# Patient Record
Sex: MALE | Race: WHITE | Employment: FULL TIME | URBAN - METROPOLITAN AREA
[De-identification: names, ages, dates, MRNs, and addresses within clinical notes are randomized per-mention and may not be internally consistent; named-entity substitution may affect disease eponyms.]

---

## 2017-03-24 ENCOUNTER — TELEPHONE (OUTPATIENT)
Dept: FAMILY MEDICINE CLINIC | Facility: CLINIC | Age: 47
End: 2017-03-24

## 2017-03-24 RX ORDER — AZITHROMYCIN 250 MG/1
250 TABLET, FILM COATED ORAL DAILY
Qty: 1 PACKAGE | Refills: 0 | Status: SHIPPED | OUTPATIENT
Start: 2017-03-24 | End: 2017-06-06 | Stop reason: ALTCHOICE

## 2017-03-24 NOTE — TELEPHONE ENCOUNTER
Patient c/o sore throat and runny nose for the past 4 days. Coughing and spitting up green mucus. Chest and head congestion. No shortness of breath. No temp. Leaving for a [place] cruise tomorrow. Dr Michelle Florain usually prescribes a [place] Tae for sinus infection.

## 2017-06-05 ENCOUNTER — NURSE ONLY (OUTPATIENT)
Dept: FAMILY MEDICINE CLINIC | Facility: CLINIC | Age: 47
End: 2017-06-05

## 2017-06-05 DIAGNOSIS — Z02.9 ENCOUNTERS FOR ADMINISTRATIVE PURPOSE: Primary | ICD-10-CM

## 2017-06-05 NOTE — PROGRESS NOTES
Patient walked into clinic for BP check. Patient states he had a nose bleed and his wife told him it could be high blood pressure. Patient also state he has had bad allergies this spring.   Patient states he checked his BP several times at home and it kep

## 2017-06-06 ENCOUNTER — TELEPHONE (OUTPATIENT)
Dept: FAMILY MEDICINE CLINIC | Facility: CLINIC | Age: 47
End: 2017-06-06

## 2017-06-06 NOTE — TELEPHONE ENCOUNTER
Left message for patient to call office back. Office phone number provided. MD Rajni Reina Nurse                   Please have him see me this week. Have him stop any Sudafed/decongestant type products.  Have him stop any Motrin/

## 2017-06-06 NOTE — TELEPHONE ENCOUNTER
Pt notified by phone. Appt scheduled for 6/8/17.     Future Appointments  Date Time Provider Brennon Franco   6/8/2017 4:00 PM Lyndsey Chan MD [facility]   7/14/2017 8:15 AM Lyndsey Chan MD [facility]

## 2017-06-08 ENCOUNTER — OFFICE VISIT (OUTPATIENT)
Dept: FAMILY MEDICINE CLINIC | Facility: CLINIC | Age: 47
End: 2017-06-08

## 2017-06-08 VITALS
SYSTOLIC BLOOD PRESSURE: 140 MMHG | BODY MASS INDEX: 36 KG/M2 | HEART RATE: 76 BPM | TEMPERATURE: 98 F | DIASTOLIC BLOOD PRESSURE: 100 MMHG | WEIGHT: 257 LBS | RESPIRATION RATE: 16 BRPM

## 2017-06-08 DIAGNOSIS — R53.83 FATIGUE, UNSPECIFIED TYPE: ICD-10-CM

## 2017-06-08 DIAGNOSIS — R04.0 EPISTAXIS: ICD-10-CM

## 2017-06-08 DIAGNOSIS — G89.29 CHRONIC CHEST WALL PAIN: ICD-10-CM

## 2017-06-08 DIAGNOSIS — R07.89 CHRONIC CHEST WALL PAIN: ICD-10-CM

## 2017-06-08 DIAGNOSIS — R03.0 ELEVATED BLOOD PRESSURE READING: Primary | ICD-10-CM

## 2017-06-08 PROCEDURE — 84443 ASSAY THYROID STIM HORMONE: CPT | Performed by: FAMILY MEDICINE

## 2017-06-08 PROCEDURE — 84439 ASSAY OF FREE THYROXINE: CPT | Performed by: FAMILY MEDICINE

## 2017-06-08 PROCEDURE — 99214 OFFICE O/P EST MOD 30 MIN: CPT | Performed by: FAMILY MEDICINE

## 2017-06-08 PROCEDURE — 36415 COLL VENOUS BLD VENIPUNCTURE: CPT | Performed by: FAMILY MEDICINE

## 2017-06-08 RX ORDER — CYCLOBENZAPRINE HCL 10 MG
10 TABLET ORAL NIGHTLY PRN
Qty: 30 TABLET | Refills: 0 | Status: SHIPPED | OUTPATIENT
Start: 2017-06-08 | End: 2017-07-07

## 2017-06-08 RX ORDER — LISINOPRIL 10 MG/1
10 TABLET ORAL DAILY
Qty: 30 TABLET | Refills: 1 | Status: SHIPPED | OUTPATIENT
Start: 2017-06-08 | End: 2017-07-17

## 2017-06-08 NOTE — PROGRESS NOTES
Woo Rodrigues is a 41year old male. CC:  Patient presents with:  Blood Pressure: has had bloody noses per pt      HPI:  Worried about elevated BP. He noted to episodes of epistaxis this past week.  He has never had this in the past. He also notes nhan [OTHER] Mother         Relation Status Death Age Comments   Fa  62 prostate cancer   Mo Alive          Smoking Status: Never Smoker                      Smokeless Status: Never Used                        Alcohol Use: Yes           1.8 oz/week Follow-up on file.                 Authorized by Chris Mccormick M.D.

## 2017-06-22 ENCOUNTER — OFFICE VISIT (OUTPATIENT)
Dept: FAMILY MEDICINE CLINIC | Facility: CLINIC | Age: 47
End: 2017-06-22

## 2017-06-22 VITALS
WEIGHT: 260.38 LBS | BODY MASS INDEX: 36 KG/M2 | RESPIRATION RATE: 16 BRPM | HEART RATE: 84 BPM | SYSTOLIC BLOOD PRESSURE: 122 MMHG | DIASTOLIC BLOOD PRESSURE: 85 MMHG | TEMPERATURE: 99 F

## 2017-06-22 DIAGNOSIS — I10 ESSENTIAL HYPERTENSION: Primary | ICD-10-CM

## 2017-06-22 DIAGNOSIS — Z23 NEED FOR VACCINATION: ICD-10-CM

## 2017-06-22 PROCEDURE — 99213 OFFICE O/P EST LOW 20 MIN: CPT | Performed by: FAMILY MEDICINE

## 2017-06-22 RX ORDER — ASPIRIN 81 MG/1
81 TABLET, CHEWABLE ORAL DAILY
Refills: 0 | COMMUNITY
End: 2020-07-21

## 2017-06-22 NOTE — PROGRESS NOTES
Daniel Coats is a 41year old male. CC:  Patient presents with:   Follow - Up: BP  per pt      HPI:  Here to follow up hypertension  Home BP readings: none  Medication side effects: none  Chest pain: none  Headaches: are now gone  Visual changes: non oz/week       0 Standard drinks or equivalent, 3 Cans of beer per week       Comment: occasional       ROS:  General: energy level stable  Musculoskeletal: back pain is much jose with the Flexeril    Vitals: /85 mmHg  Pulse 84  Temp(Src) 99.1 °F (37

## 2017-06-26 RX ORDER — PRAVASTATIN SODIUM 40 MG
TABLET ORAL
Qty: 30 TABLET | Refills: 5 | Status: SHIPPED | OUTPATIENT
Start: 2017-06-26 | End: 2017-07-17 | Stop reason: ALTCHOICE

## 2017-06-26 NOTE — TELEPHONE ENCOUNTER
Last OV 6/22/17, Future Appointments  Date Time Provider Brennon Franco   7/14/2017 8:15 AM Lazarus Capron, MD [facility] Joseamanda Staffordmartin       Last rx given 12/20/16

## 2017-07-07 RX ORDER — CYCLOBENZAPRINE HCL 10 MG
TABLET ORAL
Qty: 30 TABLET | Refills: 0 | Status: SHIPPED | OUTPATIENT
Start: 2017-07-07 | End: 2019-02-25

## 2017-07-14 ENCOUNTER — OFFICE VISIT (OUTPATIENT)
Dept: FAMILY MEDICINE CLINIC | Facility: CLINIC | Age: 47
End: 2017-07-14

## 2017-07-14 VITALS
BODY MASS INDEX: 37.5 KG/M2 | RESPIRATION RATE: 16 BRPM | HEIGHT: 69.5 IN | WEIGHT: 259 LBS | SYSTOLIC BLOOD PRESSURE: 122 MMHG | HEART RATE: 72 BPM | TEMPERATURE: 98 F | DIASTOLIC BLOOD PRESSURE: 88 MMHG

## 2017-07-14 DIAGNOSIS — Z80.42 FAMILY HISTORY OF PROSTATE CANCER IN FATHER: ICD-10-CM

## 2017-07-14 DIAGNOSIS — I10 ESSENTIAL HYPERTENSION: ICD-10-CM

## 2017-07-14 DIAGNOSIS — E78.5 HYPERLIPIDEMIA, UNSPECIFIED HYPERLIPIDEMIA TYPE: ICD-10-CM

## 2017-07-14 DIAGNOSIS — Z00.00 WELL ADULT EXAM: Primary | ICD-10-CM

## 2017-07-14 DIAGNOSIS — Z23 NEED FOR VACCINATION: ICD-10-CM

## 2017-07-14 LAB
ALBUMIN SERPL-MCNC: 4.2 G/DL (ref 3.5–4.8)
ALP LIVER SERPL-CCNC: 58 U/L (ref 45–117)
ALT SERPL-CCNC: 60 U/L (ref 17–63)
AST SERPL-CCNC: 26 U/L (ref 15–41)
BILIRUB SERPL-MCNC: 0.8 MG/DL (ref 0.1–2)
BUN BLD-MCNC: 14 MG/DL (ref 8–20)
CALCIUM BLD-MCNC: 8.9 MG/DL (ref 8.3–10.3)
CHLORIDE: 106 MMOL/L (ref 101–111)
CHOLEST SMN-MCNC: 242 MG/DL (ref ?–200)
CO2: 26 MMOL/L (ref 22–32)
COMPLEXED PSA SERPL-MCNC: 0.39 NG/ML (ref 0.01–4)
CREAT BLD-MCNC: 0.92 MG/DL (ref 0.7–1.3)
ERYTHROCYTE [DISTWIDTH] IN BLOOD BY AUTOMATED COUNT: 12.5 % (ref 11.5–16)
GLUCOSE BLD-MCNC: 89 MG/DL (ref 70–99)
HCT VFR BLD AUTO: 47.4 % (ref 37–53)
HDLC SERPL-MCNC: 44 MG/DL (ref 45–?)
HDLC SERPL: 5.5 {RATIO} (ref ?–4.97)
HGB BLD-MCNC: 16 G/DL (ref 13–17)
LDLC SERPL CALC-MCNC: 146 MG/DL (ref ?–130)
M PROTEIN MFR SERPL ELPH: 8.2 G/DL (ref 6.1–8.3)
MCH RBC QN AUTO: 30.6 PG (ref 27–33.2)
MCHC RBC AUTO-ENTMCNC: 33.8 G/DL (ref 31–37)
MCV RBC AUTO: 90.6 FL (ref 80–99)
NONHDLC SERPL-MCNC: 198 MG/DL (ref ?–130)
PLATELET # BLD AUTO: 109 10(3)UL (ref 150–450)
POTASSIUM SERPL-SCNC: 4.1 MMOL/L (ref 3.6–5.1)
RBC # BLD AUTO: 5.23 X10(6)UL (ref 4.3–5.7)
RED CELL DISTRIBUTION WIDTH-SD: 41.1 FL (ref 35.1–46.3)
SODIUM SERPL-SCNC: 139 MMOL/L (ref 136–144)
TRIGLYCERIDES: 261 MG/DL (ref ?–150)
TSI SER-ACNC: 2.55 MIU/ML (ref 0.35–5.5)
VLDL: 52 MG/DL (ref 5–40)
WBC # BLD AUTO: 4.2 X10(3) UL (ref 4–13)

## 2017-07-14 PROCEDURE — 90471 IMMUNIZATION ADMIN: CPT | Performed by: FAMILY MEDICINE

## 2017-07-14 PROCEDURE — 90715 TDAP VACCINE 7 YRS/> IM: CPT | Performed by: FAMILY MEDICINE

## 2017-07-14 PROCEDURE — 84153 ASSAY OF PSA TOTAL: CPT | Performed by: FAMILY MEDICINE

## 2017-07-14 PROCEDURE — 80050 GENERAL HEALTH PANEL: CPT | Performed by: FAMILY MEDICINE

## 2017-07-14 PROCEDURE — 80061 LIPID PANEL: CPT | Performed by: FAMILY MEDICINE

## 2017-07-14 PROCEDURE — 36415 COLL VENOUS BLD VENIPUNCTURE: CPT | Performed by: FAMILY MEDICINE

## 2017-07-14 PROCEDURE — 99396 PREV VISIT EST AGE 40-64: CPT | Performed by: FAMILY MEDICINE

## 2017-07-14 NOTE — PROGRESS NOTES
Gavin Mail is a 41year old male. CC:  Patient presents with:   Well Adult: per pt      HPI:  Yearly PX   Last lipid: 11/16    Immunization History   Administered            Date(s) Administered     TDAP                  12/13/2006     [facility]  No date: OTHER SURGICAL HISTORY      Comment: L shoulder for labral tear  No date: THYROIDECTOMY Right      Comment: Dr. Flor Steel, 2016   Family History   Problem Relation Age of Onset   • Hypertension Father    • hyperlipidemia [Other] [OT exam  Patient has been recommended 1500 calorie diet and 150 minutes of aerobic exercise per week. - COMP METABOLIC PANEL (14); Future  - CBC, PLATELET; NO DIFFERENTIAL; Future  - LIPID PANEL;  Future  - PSA SCREEN; Future  - TSH W REFLEX TO FREE T4; Futu

## 2017-07-17 ENCOUNTER — TELEPHONE (OUTPATIENT)
Dept: FAMILY MEDICINE CLINIC | Facility: CLINIC | Age: 47
End: 2017-07-17

## 2017-07-17 RX ORDER — LOSARTAN POTASSIUM 25 MG/1
25 TABLET ORAL DAILY
Qty: 30 TABLET | Refills: 1 | Status: SHIPPED | OUTPATIENT
Start: 2017-07-17 | End: 2017-09-15

## 2017-07-17 RX ORDER — ATORVASTATIN CALCIUM 40 MG/1
40 TABLET, FILM COATED ORAL NIGHTLY
Qty: 30 TABLET | Refills: 1 | Status: SHIPPED | OUTPATIENT
Start: 2017-07-17 | End: 2017-09-15

## 2017-07-17 NOTE — TELEPHONE ENCOUNTER
Patient advised. Verbalizes understanding. States he has been taking his Pravachol consistently. Please advise and sign Losartan order.

## 2017-07-17 NOTE — TELEPHONE ENCOUNTER
The Pravachol is not working well. I want him to stop it. I have sent over Lipitor to be taken in its place. It is a stronger lipid lowering medication. I will see him back in one month. Have him come fasting again to repeat lipid on the Lipitor.  Thanks

## 2017-07-17 NOTE — TELEPHONE ENCOUNTER
----- Message from Senait Fields MD sent at 7/17/2017  7:54 AM CDT -----  Please let patient know that the prostate, sugar, electrolyte, liver, kidney, thyroid, anemia and leukemia tests were fine.  His lipids have jumped significantly from about 6 months

## 2017-08-21 ENCOUNTER — OFFICE VISIT (OUTPATIENT)
Dept: FAMILY MEDICINE CLINIC | Facility: CLINIC | Age: 47
End: 2017-08-21

## 2017-08-21 VITALS
TEMPERATURE: 97 F | SYSTOLIC BLOOD PRESSURE: 125 MMHG | RESPIRATION RATE: 20 BRPM | WEIGHT: 261 LBS | BODY MASS INDEX: 38 KG/M2 | HEART RATE: 76 BPM | DIASTOLIC BLOOD PRESSURE: 85 MMHG

## 2017-08-21 DIAGNOSIS — I10 ESSENTIAL HYPERTENSION: Primary | ICD-10-CM

## 2017-08-21 DIAGNOSIS — E78.5 HYPERLIPIDEMIA, UNSPECIFIED HYPERLIPIDEMIA TYPE: ICD-10-CM

## 2017-08-21 DIAGNOSIS — D69.6 THROMBOCYTOPENIA (HCC): ICD-10-CM

## 2017-08-21 LAB
ALBUMIN SERPL-MCNC: 4.2 G/DL (ref 3.5–4.8)
ALP LIVER SERPL-CCNC: 72 U/L (ref 45–117)
ALT SERPL-CCNC: 51 U/L (ref 17–63)
AST SERPL-CCNC: 18 U/L (ref 15–41)
BILIRUB SERPL-MCNC: 0.7 MG/DL (ref 0.1–2)
BUN BLD-MCNC: 16 MG/DL (ref 8–20)
CALCIUM BLD-MCNC: 9.2 MG/DL (ref 8.3–10.3)
CHLORIDE: 109 MMOL/L (ref 101–111)
CHOLEST SMN-MCNC: 166 MG/DL (ref ?–200)
CO2: 25 MMOL/L (ref 22–32)
CREAT BLD-MCNC: 0.85 MG/DL (ref 0.7–1.3)
ERYTHROCYTE [DISTWIDTH] IN BLOOD BY AUTOMATED COUNT: 12.5 % (ref 11.5–16)
GLUCOSE BLD-MCNC: 90 MG/DL (ref 70–99)
HCT VFR BLD AUTO: 43.6 % (ref 37–53)
HDLC SERPL-MCNC: 46 MG/DL (ref 45–?)
HDLC SERPL: 3.61 {RATIO} (ref ?–4.97)
HGB BLD-MCNC: 15.2 G/DL (ref 13–17)
LDLC SERPL CALC-MCNC: 88 MG/DL (ref ?–130)
LDLC SERPL-MCNC: 32 MG/DL (ref 5–40)
M PROTEIN MFR SERPL ELPH: 8.3 G/DL (ref 6.1–8.3)
MCH RBC QN AUTO: 31.1 PG (ref 27–33.2)
MCHC RBC AUTO-ENTMCNC: 34.9 G/DL (ref 31–37)
MCV RBC AUTO: 89.3 FL (ref 80–99)
NONHDLC SERPL-MCNC: 120 MG/DL (ref ?–130)
PLATELET # BLD AUTO: 220 10(3)UL (ref 150–450)
POTASSIUM SERPL-SCNC: 4.1 MMOL/L (ref 3.6–5.1)
RBC # BLD AUTO: 4.88 X10(6)UL (ref 4.3–5.7)
RED CELL DISTRIBUTION WIDTH-SD: 41 FL (ref 35.1–46.3)
SODIUM SERPL-SCNC: 142 MMOL/L (ref 136–144)
TRIGLYCERIDES: 159 MG/DL (ref ?–150)
WBC # BLD AUTO: 5.9 X10(3) UL (ref 4–13)

## 2017-08-21 PROCEDURE — 99213 OFFICE O/P EST LOW 20 MIN: CPT | Performed by: FAMILY MEDICINE

## 2017-08-21 PROCEDURE — 85027 COMPLETE CBC AUTOMATED: CPT | Performed by: FAMILY MEDICINE

## 2017-08-21 PROCEDURE — 36415 COLL VENOUS BLD VENIPUNCTURE: CPT | Performed by: FAMILY MEDICINE

## 2017-08-21 PROCEDURE — 80053 COMPREHEN METABOLIC PANEL: CPT | Performed by: FAMILY MEDICINE

## 2017-08-21 PROCEDURE — 80061 LIPID PANEL: CPT | Performed by: FAMILY MEDICINE

## 2017-08-21 NOTE — PROGRESS NOTES
Rickey Dakin is a 41year old male. CC:  Patient presents with:   Follow - Up: on meds and bloodwork per pt      HPI:  Here to follow up hypertension  Home BP readings: none  Medication side effects: none  Chest pain: none  Headaches: none  Visu hyperlipidemia [Other] [OTHER] Mother         Relation Status Comments   Fa  at age 62 prostate cancer   Mo Alive       Smoking status: Never Smoker                                                              Smokeless tobacco: Never Used requested or ordered in this encounter      No Follow-up on file.                 Authorized by Ritchie Chew M.D.

## 2017-09-15 NOTE — TELEPHONE ENCOUNTER
Last refill on Losartan # 30 with 1 refill on 7 17 2017  Last refill on Atorvastatin #30 with 1 refill on 7 17 2017   Last OV on 8 21 2017

## 2017-09-16 RX ORDER — LOSARTAN POTASSIUM 25 MG/1
TABLET ORAL
Qty: 30 TABLET | Refills: 5 | Status: SHIPPED | OUTPATIENT
Start: 2017-09-16 | End: 2018-02-05

## 2017-09-16 RX ORDER — ATORVASTATIN CALCIUM 40 MG/1
TABLET, FILM COATED ORAL
Qty: 30 TABLET | Refills: 5 | Status: SHIPPED | OUTPATIENT
Start: 2017-09-16 | End: 2018-02-05

## 2018-02-05 RX ORDER — LOSARTAN POTASSIUM 25 MG/1
TABLET ORAL
Qty: 30 TABLET | Refills: 5 | Status: SHIPPED
Start: 2018-02-05 | End: 2018-08-12

## 2018-02-05 RX ORDER — ATORVASTATIN CALCIUM 40 MG/1
TABLET, FILM COATED ORAL
Qty: 30 TABLET | Refills: 5 | Status: SHIPPED
Start: 2018-02-05 | End: 2018-08-12

## 2018-02-05 NOTE — TELEPHONE ENCOUNTER
Last refill of both - 9/16/17 - #30 with 5 refills  Last b/p - 8/21/17 - 125/85  Last lipid panel - 8/21/17

## 2018-02-05 NOTE — TELEPHONE ENCOUNTER
Pt needs a refill of the  ATORVASTATIN 40 MG Oral Tab  And  LOSARTAN POTASSIUM 25 MG Oral Tab  To [facility] [place].

## 2018-04-04 ENCOUNTER — OFFICE VISIT (OUTPATIENT)
Dept: FAMILY MEDICINE CLINIC | Facility: CLINIC | Age: 48
End: 2018-04-04

## 2018-04-04 VITALS
WEIGHT: 259.19 LBS | SYSTOLIC BLOOD PRESSURE: 124 MMHG | HEIGHT: 69.5 IN | RESPIRATION RATE: 16 BRPM | DIASTOLIC BLOOD PRESSURE: 88 MMHG | TEMPERATURE: 98 F | BODY MASS INDEX: 37.53 KG/M2 | HEART RATE: 70 BPM

## 2018-04-04 DIAGNOSIS — H65.01 RIGHT ACUTE SEROUS OTITIS MEDIA, RECURRENCE NOT SPECIFIED: ICD-10-CM

## 2018-04-04 DIAGNOSIS — J31.0 PURULENT RHINITIS: Primary | ICD-10-CM

## 2018-04-04 DIAGNOSIS — J02.9 SORE THROAT: ICD-10-CM

## 2018-04-04 PROCEDURE — 99214 OFFICE O/P EST MOD 30 MIN: CPT | Performed by: FAMILY MEDICINE

## 2018-04-04 RX ORDER — AZITHROMYCIN 250 MG/1
TABLET, FILM COATED ORAL
Qty: 6 TABLET | Refills: 0 | Status: SHIPPED | OUTPATIENT
Start: 2018-04-04 | End: 2018-04-09

## 2018-04-04 NOTE — PROGRESS NOTES
Lashanda Coulter is a 41year old male. CC:  Patient presents with:  Sinus Problem      HPI:  The patient has primary complaint of bilateral ear congestion, facial pain  , nasal congestion and sore throat for  5 days.  Associated symptoms include myalgia Smokeless tobacco: Never Used                      Alcohol use: Yes           1.8 oz/week     Cans of beer: 3 per week     Comment: occasional       ROS:  General: lower energy  GI: Denies abdomi

## 2018-07-30 ENCOUNTER — NURSE ONLY (OUTPATIENT)
Dept: FAMILY MEDICINE CLINIC | Facility: CLINIC | Age: 48
End: 2018-07-30
Payer: COMMERCIAL

## 2018-07-30 DIAGNOSIS — E78.5 HYPERLIPIDEMIA, UNSPECIFIED HYPERLIPIDEMIA TYPE: Primary | ICD-10-CM

## 2018-07-30 LAB
ALBUMIN/GLOBULIN RATIO: 1.6 (CALC) (ref 1–2.5)
ALBUMIN: 4.7 G/DL (ref 3.6–5.1)
ALKALINE PHOSPHATASE: 68 U/L (ref 40–115)
ALT: 49 U/L (ref 9–46)
AST: 29 U/L (ref 10–40)
BILIRUBIN, TOTAL: 0.9 MG/DL (ref 0.2–1.2)
BUN: 16 MG/DL (ref 7–25)
CALCIUM: 9.8 MG/DL (ref 8.6–10.3)
CARBON DIOXIDE: 29 MMOL/L (ref 20–31)
CHLORIDE: 102 MMOL/L (ref 98–110)
CHOL/HDLC RATIO: 4 (CALC)
CHOLESTEROL, TOTAL: 234 MG/DL
CREATININE: 0.85 MG/DL (ref 0.6–1.35)
EGFR IF AFRICN AM: 121 ML/MIN/1.73M2
EGFR IF NONAFRICN AM: 104 ML/MIN/1.73M2
GLOBULIN: 2.9 G/DL (CALC) (ref 1.9–3.7)
GLUCOSE: 89 MG/DL (ref 65–99)
HDL CHOLESTEROL: 59 MG/DL
LDL-CHOLESTEROL: 137 MG/DL (CALC)
NON-HDL CHOLESTEROL: 175 MG/DL (CALC)
POTASSIUM: 4.1 MMOL/L (ref 3.5–5.3)
PROTEIN, TOTAL: 7.6 G/DL (ref 6.1–8.1)
SODIUM: 139 MMOL/L (ref 135–146)
TRIGLYCERIDES: 250 MG/DL

## 2018-07-30 NOTE — PROGRESS NOTES
Pt here for follow up blood work    This RN tried 2 times on the right arm unsuccessful and Marivel Littlejohn tried 2 times on left arm unsuccessfully    Pt was sent with the orders to [address] [place] [address].

## 2018-07-31 ENCOUNTER — TELEPHONE (OUTPATIENT)
Dept: FAMILY MEDICINE CLINIC | Facility: CLINIC | Age: 48
End: 2018-07-31

## 2018-07-31 DIAGNOSIS — E78.5 HYPERLIPIDEMIA, UNSPECIFIED HYPERLIPIDEMIA TYPE: Primary | ICD-10-CM

## 2018-07-31 NOTE — TELEPHONE ENCOUNTER
----- Message from Chris Mccormick MD sent at 7/31/2018  3:08 PM CDT -----  Let's have him repeat the lipid in 3 months, thanks   ----- Message -----  From: Mikki Phipps RN  Sent: 7/31/2018   1:12 PM  To: Chris Mccormick MD    Patient advised.   States

## 2018-08-13 RX ORDER — ATORVASTATIN CALCIUM 40 MG/1
TABLET, FILM COATED ORAL
Qty: 30 TABLET | Refills: 5 | Status: SHIPPED | OUTPATIENT
Start: 2018-08-13 | End: 2019-02-02

## 2018-08-13 RX ORDER — LOSARTAN POTASSIUM 25 MG/1
TABLET ORAL
Qty: 30 TABLET | Refills: 5 | Status: SHIPPED | OUTPATIENT
Start: 2018-08-13 | End: 2019-02-02

## 2018-08-13 NOTE — TELEPHONE ENCOUNTER
Atorvastatin Last refilled on 2/5/18 for # 30 with 5 refills  Losartan refilled 2/5/18 #30 with 5 refills  Last lipid 7/30/18  Last seen on 4/4/18, /88  No future appointments. Thank you.

## 2018-11-30 ENCOUNTER — PATIENT OUTREACH (OUTPATIENT)
Dept: FAMILY MEDICINE CLINIC | Facility: CLINIC | Age: 48
End: 2018-11-30

## 2019-01-07 ENCOUNTER — OFFICE VISIT (OUTPATIENT)
Dept: FAMILY MEDICINE CLINIC | Facility: CLINIC | Age: 49
End: 2019-01-07
Payer: COMMERCIAL

## 2019-01-07 VITALS
BODY MASS INDEX: 34.72 KG/M2 | TEMPERATURE: 99 F | OXYGEN SATURATION: 98 % | HEIGHT: 71 IN | DIASTOLIC BLOOD PRESSURE: 82 MMHG | SYSTOLIC BLOOD PRESSURE: 118 MMHG | WEIGHT: 248 LBS | HEART RATE: 77 BPM

## 2019-01-07 DIAGNOSIS — J06.9 VIRAL UPPER RESPIRATORY TRACT INFECTION: Primary | ICD-10-CM

## 2019-01-07 PROCEDURE — 99213 OFFICE O/P EST LOW 20 MIN: CPT | Performed by: NURSE PRACTITIONER

## 2019-01-07 RX ORDER — DEXAMETHASONE 2 MG/1
TABLET ORAL
Refills: 0 | COMMUNITY
Start: 2018-10-24 | End: 2019-02-25

## 2019-01-07 RX ORDER — AZITHROMYCIN 250 MG/1
TABLET, FILM COATED ORAL
Qty: 6 TABLET | Refills: 0 | Status: SHIPPED | OUTPATIENT
Start: 2019-01-07 | End: 2019-02-25

## 2019-01-08 NOTE — PROGRESS NOTES
CHIEF COMPLAINT:   Patient presents with:  Cough: congestion/sinus pressure x 1 week. no fever/v/d    HPI:   Obi Stanley is a 57year old male who presents for sinus congestion for  7  days. Symptoms have been worsening since onset.  Sinus congestion/jasvir Performed by Graciela Hernandez MD at [facility] MAIN OR      Family History   Problem Relation Age of Onset   • Hypertension Father    • Other (hyperlipidemia [Other]) Mother       Social History    Tobacco Use      Smoking status: Never Smoker      Smokeless t 1. Viral upper respiratory tract infection  otc medications    PLAN: I discussed viral vs bacterial infections. Patient informed that today's presentation is of viral nature and abx tx is not necessary.   I encouraged patient to begin use of nasal spray an · You can use an over-the-counter decongestant, unless a similar medicine was prescribed to you. Nasal sprays work the fastest. Use one that contains phenylephrine or oxymetazoline. First blow your nose gently. Then use the spray.  Do not use these medicine © 5764-1762 [employer] [address]. [address] [facility] [place], [place], [address] [place]. All rights reserved. This information is not intended as a substitute for professional medical care. Always follow your healthcare professional's instructions.             The

## 2019-02-02 RX ORDER — ATORVASTATIN CALCIUM 40 MG/1
TABLET, FILM COATED ORAL
Qty: 90 TABLET | Refills: 0 | Status: SHIPPED | OUTPATIENT
Start: 2019-02-02 | End: 2019-05-06

## 2019-02-02 RX ORDER — LOSARTAN POTASSIUM 25 MG/1
TABLET ORAL
Qty: 90 TABLET | Refills: 0 | Status: SHIPPED | OUTPATIENT
Start: 2019-02-02 | End: 2019-05-06

## 2019-02-02 NOTE — TELEPHONE ENCOUNTER
Both medications Last refilled on 8/13/18 for # 30 with 5 refills  Last lipid 12/13/18  Last OV 4/4/18, /82 on 1/7/19  No future appointments. Thank you.

## 2019-02-25 ENCOUNTER — OFFICE VISIT (OUTPATIENT)
Dept: FAMILY MEDICINE CLINIC | Facility: CLINIC | Age: 49
End: 2019-02-25
Payer: COMMERCIAL

## 2019-02-25 VITALS
HEART RATE: 72 BPM | WEIGHT: 233 LBS | BODY MASS INDEX: 32.62 KG/M2 | DIASTOLIC BLOOD PRESSURE: 88 MMHG | HEIGHT: 71 IN | TEMPERATURE: 98 F | SYSTOLIC BLOOD PRESSURE: 116 MMHG | RESPIRATION RATE: 16 BRPM

## 2019-02-25 DIAGNOSIS — R22.1 NECK FULLNESS: Primary | ICD-10-CM

## 2019-02-25 DIAGNOSIS — E89.0 H/O PARTIAL THYROIDECTOMY: ICD-10-CM

## 2019-02-25 LAB
T3FREE SERPL-MCNC: 2.78 PG/ML (ref 2.4–4.2)
T4 FREE SERPL-MCNC: 0.9 NG/DL (ref 0.8–1.7)
TSI SER-ACNC: 3.14 MIU/ML (ref 0.36–3.74)

## 2019-02-25 PROCEDURE — 99214 OFFICE O/P EST MOD 30 MIN: CPT | Performed by: FAMILY MEDICINE

## 2019-02-25 PROCEDURE — 84481 FREE ASSAY (FT-3): CPT | Performed by: FAMILY MEDICINE

## 2019-02-25 PROCEDURE — 84439 ASSAY OF FREE THYROXINE: CPT | Performed by: FAMILY MEDICINE

## 2019-02-25 PROCEDURE — 84443 ASSAY THYROID STIM HORMONE: CPT | Performed by: FAMILY MEDICINE

## 2019-02-25 PROCEDURE — 36415 COLL VENOUS BLD VENIPUNCTURE: CPT | Performed by: FAMILY MEDICINE

## 2019-02-25 NOTE — PROGRESS NOTES
Lisa Kuhn is a 57year old male. CC:  Patient presents with:  Throat Problem: lump when swallowing per pt      HPI:  He has noted a neck fullness in the thyroid area for 1.5 weeks. The fullness will migrate from one side to the other.  No dysphagi Relation Age of Onset   • Hypertension Father    • Other (hyperlipidemia [Other]) Mother       Family Status   Relation Status   • Fa  at age 62        prostate cancer   • Mo Alive      Social History    Tobacco Use      Smoking status: Never Smoke Triiodothyronine,Free,Serum [E]          Standing Status: Future          Number of Occurrences: 1          Standing Expiration Date: 2/25/2020      TSH and Free T4 [E]          Standing Status: Future          Number of Occurrences: 1          Standing Ex

## 2019-05-06 RX ORDER — ATORVASTATIN CALCIUM 40 MG/1
TABLET, FILM COATED ORAL
Qty: 90 TABLET | Refills: 1 | Status: SHIPPED | OUTPATIENT
Start: 2019-05-06 | End: 2019-12-01

## 2019-05-06 RX ORDER — LOSARTAN POTASSIUM 25 MG/1
TABLET ORAL
Qty: 90 TABLET | Refills: 1 | Status: SHIPPED | OUTPATIENT
Start: 2019-05-06 | End: 2019-12-01

## 2019-06-19 ENCOUNTER — OFFICE VISIT (OUTPATIENT)
Dept: FAMILY MEDICINE CLINIC | Facility: CLINIC | Age: 49
End: 2019-06-19
Payer: COMMERCIAL

## 2019-06-19 VITALS
WEIGHT: 228.81 LBS | DIASTOLIC BLOOD PRESSURE: 80 MMHG | RESPIRATION RATE: 20 BRPM | SYSTOLIC BLOOD PRESSURE: 110 MMHG | TEMPERATURE: 98 F | BODY MASS INDEX: 32 KG/M2 | HEART RATE: 88 BPM

## 2019-06-19 DIAGNOSIS — R22.1 NECK MASS: Primary | ICD-10-CM

## 2019-06-19 PROCEDURE — 99214 OFFICE O/P EST MOD 30 MIN: CPT | Performed by: FAMILY MEDICINE

## 2019-06-19 NOTE — PROGRESS NOTES
Daniel Coats is a 54year old male. CC:  Patient presents with:  Lump: per pt, lump in lower throat; tightness      HPI:  Throat pain for about 2 weeks. Also notes discomfort with neck movement and swallowing. No dysphagia. No fevers. No trauma.  Iss Right     Dr. Cat Reynolds, 2016   • THYROIDECTOMY Right 8/4/2016    Performed by Nataly Martin MD at [facility] MAIN OR      Family History   Problem Relation Age of Onset   • Hypertension Father    • Other (hyperlipidemia [Other]) Mother       Family Status No prescriptions requested or ordered in this encounter         No follow-ups on file.       Authorized by Joey Horner M.D.

## 2019-06-26 ENCOUNTER — TELEPHONE (OUTPATIENT)
Dept: FAMILY MEDICINE CLINIC | Facility: CLINIC | Age: 49
End: 2019-06-26

## 2019-07-01 ENCOUNTER — TELEPHONE (OUTPATIENT)
Dept: FAMILY MEDICINE CLINIC | Facility: CLINIC | Age: 49
End: 2019-07-01

## 2019-07-01 NOTE — TELEPHONE ENCOUNTER
Please let patient know or leave message that the CT neck done at [facility] 7/1/2019 shows no abnormalities. It appears they were able to specifically isolate the area of concern and no abnormality was noted.  If he is still symptomatic then an ENT co

## 2019-12-02 RX ORDER — ATORVASTATIN CALCIUM 40 MG/1
TABLET, FILM COATED ORAL
Qty: 90 TABLET | Refills: 1 | Status: SHIPPED | OUTPATIENT
Start: 2019-12-02 | End: 2020-06-16

## 2019-12-02 RX ORDER — LOSARTAN POTASSIUM 25 MG/1
TABLET ORAL
Qty: 90 TABLET | Refills: 1 | Status: SHIPPED | OUTPATIENT
Start: 2019-12-02 | End: 2020-06-16

## 2019-12-02 NOTE — TELEPHONE ENCOUNTER
Losartan and Atorvastatin both last refilled on 5/6/19 with 1 refill both for #90  Last OV 6/19/19  No future appt

## 2019-12-15 ENCOUNTER — OFFICE VISIT (OUTPATIENT)
Dept: FAMILY MEDICINE CLINIC | Facility: CLINIC | Age: 49
End: 2019-12-15
Payer: COMMERCIAL

## 2019-12-15 VITALS
OXYGEN SATURATION: 98 % | RESPIRATION RATE: 16 BRPM | TEMPERATURE: 97 F | HEART RATE: 70 BPM | WEIGHT: 228 LBS | BODY MASS INDEX: 32 KG/M2 | DIASTOLIC BLOOD PRESSURE: 80 MMHG | SYSTOLIC BLOOD PRESSURE: 126 MMHG

## 2019-12-15 DIAGNOSIS — J06.9 VIRAL UPPER RESPIRATORY TRACT INFECTION: Primary | ICD-10-CM

## 2019-12-15 PROCEDURE — 99213 OFFICE O/P EST LOW 20 MIN: CPT | Performed by: NURSE PRACTITIONER

## 2019-12-15 RX ORDER — AZITHROMYCIN 250 MG/1
TABLET, FILM COATED ORAL
Qty: 6 TABLET | Refills: 0 | Status: SHIPPED | OUTPATIENT
Start: 2019-12-15 | End: 2020-02-11 | Stop reason: CLARIF

## 2019-12-15 NOTE — PATIENT INSTRUCTIONS
Use Dayquil and Nyquil for symptoms  If the symptoms are not getting by Tuesday to start the antibiotic      Understanding the Cold Virus  Colds are the most common illness that people get.  Most adults get 2 or 3 colds per year, and most children get 5 to symptoms. Taking antibiotics when you don’t need them can make them work less well when you need them for another illness. Follow all directions for using medicines, especially when giving them to children.  Contact your healthcare provider if you have any 17667. All rights reserved. This information is not intended as a substitute for professional medical care. Always follow your healthcare professional's instructions.

## 2019-12-15 NOTE — PROGRESS NOTES
CHIEF COMPLAINT:   Patient presents with:  Sinus Problem: siknus pressure/runny nose/eye pressure/cough x 1 week. no fever      HPI:   Mayo Altamirano is a 54year old male who presents for upper respiratory symptoms for  5 days.  Patient reports sore throat SKIN: no rashes or abnormal skin lesions  HEENT: See HPI  LUNGS: See HPI  CARDIOVASCULAR: denies chest pain or palpitations   GI: denies N/V/C or abdominal pain      EXAM:   /80 (BP Location: Left arm, Patient Position: Sitting, Cuff Size: large)   P Colds are the most common illness that people get. Most adults get 2 or 3 colds per year, and most children get 5 to 7 colds per year. Colds may be caused by over 200 types of viruses. The most common of these are rhinoviruses (“rhino” refers to the nose). Follow all directions for using medicines, especially when giving them to children. Contact your healthcare provider if you have any questions about using cold medicines safely.  Before buying cold medicines, make a list of any prescription medicines you ta © 3510-7165 [address]. [address] [facility], [facility] [place]. All rights reserved. This information is not intended as a substitute for professional medical care. Always follow your healthcare professional's instructions.

## 2020-02-11 ENCOUNTER — OFFICE VISIT (OUTPATIENT)
Dept: FAMILY MEDICINE CLINIC | Facility: CLINIC | Age: 50
End: 2020-02-11
Payer: COMMERCIAL

## 2020-02-11 VITALS
TEMPERATURE: 98 F | HEART RATE: 69 BPM | OXYGEN SATURATION: 99 % | WEIGHT: 234 LBS | RESPIRATION RATE: 16 BRPM | BODY MASS INDEX: 33 KG/M2 | DIASTOLIC BLOOD PRESSURE: 72 MMHG | SYSTOLIC BLOOD PRESSURE: 112 MMHG

## 2020-02-11 DIAGNOSIS — H69.83 ETD (EUSTACHIAN TUBE DYSFUNCTION), BILATERAL: Primary | ICD-10-CM

## 2020-02-11 DIAGNOSIS — J06.9 VIRAL URI: ICD-10-CM

## 2020-02-11 DIAGNOSIS — J02.9 SORE THROAT: ICD-10-CM

## 2020-02-11 LAB
CONTROL LINE PRESENT WITH A CLEAR BACKGROUND (YES/NO): YES YES/NO
KIT LOT #: NORMAL NUMERIC
STREP GRP A CUL-SCR: NEGATIVE

## 2020-02-11 PROCEDURE — 99213 OFFICE O/P EST LOW 20 MIN: CPT | Performed by: PHYSICIAN ASSISTANT

## 2020-02-11 PROCEDURE — 87880 STREP A ASSAY W/OPTIC: CPT | Performed by: PHYSICIAN ASSISTANT

## 2020-02-11 RX ORDER — AZITHROMYCIN 250 MG/1
TABLET, FILM COATED ORAL
Qty: 6 TABLET | Refills: 0 | Status: SHIPPED | OUTPATIENT
Start: 2020-02-11 | End: 2020-07-21

## 2020-02-11 RX ORDER — FLUTICASONE PROPIONATE 50 MCG
2 SPRAY, SUSPENSION (ML) NASAL DAILY
Qty: 1 INHALER | Refills: 0 | Status: SHIPPED | OUTPATIENT
Start: 2020-02-11 | End: 2021-09-16

## 2020-02-11 NOTE — PATIENT INSTRUCTIONS
-Push fluids  -Motrin  -flonase  -Hold onto antibiotic. Do not take unless have ear pain. Earache, No Infection (Adult)  Earaches can happen without an infection.  This occurs when air and fluid build up behind the eardrum causing a feeling of fullne medicines. Aspirin should never be used in anyone under 25years of age who is ill with a fever. It may cause severe liver damage. · You may use over-the-counter decongestants such as phenylephrine or pseudoephedrine. But they are not always helpful.  [facility]'

## 2020-02-11 NOTE — PROGRESS NOTES
CHIEF COMPLAINT:   Patient presents with:  Pain: neck pain x 1 day. throat redness. no fever/cough/congestion/post nasal drip  Ear Problem: ear pressure x today. HPI:   Jimenez Galloway is a 54year old male who presents for URI symptoms for 1 day.   He Smoking status: Never Smoker      Smokeless tobacco: Never Used    Alcohol use:  Yes      Alcohol/week: 3.0 standard drinks      Types: 3 Cans of beer per week      Comment: occasional    Drug use: No        REVIEW OF SYSTEMS:   GENERAL: Normal appetite Collection Time: 02/11/20  4:51 PM   Result Value Ref Range    Strep Grp A Screen negative Negative    Control Line Present with a clear background (yes/no) yes Yes/No    Kit Lot # [id number] Numeric    Kit Expiration Date 5/30/21 Date       ASSESSMENT AND P It often takes from several weeks up to 3 months for the fluid to clear on its own. Oral pain relievers and ear drops help if there is pain. Decongestants and antihistamines sometimes help. Antibiotics don't help since there is no infection.  Your doctor ma · Fever of 100.4°F (38°C) or higher, or as directed by your healthcare provider  · Fluid or blood draining from the ear  · Headache or sinus pain  · Stiff neck  · Unusual drowsiness or confusion  Date Last Reviewed: 10/1/2016  © 5795-6609 [facility]

## 2020-06-16 RX ORDER — ATORVASTATIN CALCIUM 40 MG/1
TABLET, FILM COATED ORAL
Qty: 30 TABLET | Refills: 0 | Status: SHIPPED | OUTPATIENT
Start: 2020-06-16 | End: 2020-07-16

## 2020-06-16 RX ORDER — LOSARTAN POTASSIUM 25 MG/1
TABLET ORAL
Qty: 30 TABLET | Refills: 0 | Status: SHIPPED | OUTPATIENT
Start: 2020-06-16 | End: 2020-07-16

## 2020-06-16 NOTE — TELEPHONE ENCOUNTER
Please let patient or caregiver know or leave message that refill request for Losartan and Lipitor has come from the pharmacy. These have been refilled for one month. Per prescribing guidelines, I need to show continuity and oversight of care for all prescriptions. This means a visit at least every 6 months. It has been 12 months since our last visit. Please schedule him a wellness exam in the next week or so and have him fast so labs can be done.   Thanks

## 2020-07-16 RX ORDER — LOSARTAN POTASSIUM 25 MG/1
TABLET ORAL
Qty: 30 TABLET | Refills: 0 | Status: SHIPPED | OUTPATIENT
Start: 2020-07-16 | End: 2020-08-15

## 2020-07-16 RX ORDER — ATORVASTATIN CALCIUM 40 MG/1
TABLET, FILM COATED ORAL
Qty: 30 TABLET | Refills: 0 | Status: SHIPPED | OUTPATIENT
Start: 2020-07-16 | End: 2020-08-15

## 2020-07-16 NOTE — TELEPHONE ENCOUNTER
Hypertension Medications Protocol Failed7/16 10:33 AM   CMP or BMP in past 12 months    Last serum creatinine< 2.0    Appointment in past 6 or next 3 months     Patient has an appointment on 7 21 2020

## 2020-07-16 NOTE — TELEPHONE ENCOUNTER
Please let patient or caregiver know or leave message that refill request for Lipitor has come from the pharmacy. It has been refilled.    He is overdue for f/u on the medication, fasting labs and wellness exam.  Please schedule him a wellness exam in the n

## 2020-07-16 NOTE — TELEPHONE ENCOUNTER
Cholesterol Medication Protocol Failed7/16 9:32 AM   ALT < 80    ALT resulted within past year    Lipid panel within past 12 months    Appointment within past 12 or next 3 months     Patient has an appointment scheduled for 7 21 2020

## 2020-07-21 ENCOUNTER — OFFICE VISIT (OUTPATIENT)
Dept: FAMILY MEDICINE CLINIC | Facility: CLINIC | Age: 50
End: 2020-07-21
Payer: COMMERCIAL

## 2020-07-21 ENCOUNTER — APPOINTMENT (OUTPATIENT)
Dept: LAB | Age: 50
End: 2020-07-21
Attending: FAMILY MEDICINE
Payer: COMMERCIAL

## 2020-07-21 VITALS
HEIGHT: 69 IN | TEMPERATURE: 97 F | DIASTOLIC BLOOD PRESSURE: 88 MMHG | SYSTOLIC BLOOD PRESSURE: 138 MMHG | BODY MASS INDEX: 38.46 KG/M2 | HEART RATE: 88 BPM | WEIGHT: 259.63 LBS

## 2020-07-21 DIAGNOSIS — E89.0 H/O PARTIAL THYROIDECTOMY: ICD-10-CM

## 2020-07-21 DIAGNOSIS — R22.1 NECK FULLNESS: ICD-10-CM

## 2020-07-21 DIAGNOSIS — R10.11 RUQ PAIN: ICD-10-CM

## 2020-07-21 DIAGNOSIS — I10 ESSENTIAL HYPERTENSION: ICD-10-CM

## 2020-07-21 DIAGNOSIS — Z00.00 WELL ADULT EXAM: Primary | ICD-10-CM

## 2020-07-21 DIAGNOSIS — Z00.00 WELL ADULT EXAM: ICD-10-CM

## 2020-07-21 DIAGNOSIS — E78.5 HYPERLIPIDEMIA, UNSPECIFIED HYPERLIPIDEMIA TYPE: ICD-10-CM

## 2020-07-21 LAB
ALBUMIN SERPL-MCNC: 4.2 G/DL (ref 3.4–5)
ALBUMIN/GLOB SERPL: 1.2 {RATIO} (ref 1–2)
ALP LIVER SERPL-CCNC: 61 U/L (ref 45–117)
ALT SERPL-CCNC: 44 U/L (ref 16–61)
ANION GAP SERPL CALC-SCNC: 3 MMOL/L (ref 0–18)
AST SERPL-CCNC: 23 U/L (ref 15–37)
BILIRUB SERPL-MCNC: 1.4 MG/DL (ref 0.1–2)
BUN BLD-MCNC: 10 MG/DL (ref 7–18)
BUN/CREAT SERPL: 11.8 (ref 10–20)
CALCIUM BLD-MCNC: 9 MG/DL (ref 8.5–10.1)
CHLORIDE SERPL-SCNC: 105 MMOL/L (ref 98–112)
CHOLEST SMN-MCNC: 197 MG/DL (ref ?–200)
CO2 SERPL-SCNC: 29 MMOL/L (ref 21–32)
CREAT BLD-MCNC: 0.85 MG/DL (ref 0.7–1.3)
DEPRECATED RDW RBC AUTO: 43.8 FL (ref 35.1–46.3)
ERYTHROCYTE [DISTWIDTH] IN BLOOD BY AUTOMATED COUNT: 12.7 % (ref 11–15)
GLOBULIN PLAS-MCNC: 3.6 G/DL (ref 2.8–4.4)
GLUCOSE BLD-MCNC: 82 MG/DL (ref 70–99)
HCT VFR BLD AUTO: 47.9 % (ref 39–53)
HDLC SERPL-MCNC: 51 MG/DL (ref 40–59)
HGB BLD-MCNC: 15.7 G/DL (ref 13–17.5)
LDLC SERPL CALC-MCNC: 115 MG/DL (ref ?–100)
M PROTEIN MFR SERPL ELPH: 7.8 G/DL (ref 6.4–8.2)
MCH RBC QN AUTO: 30.7 PG (ref 26–34)
MCHC RBC AUTO-ENTMCNC: 32.8 G/DL (ref 31–37)
MCV RBC AUTO: 93.6 FL (ref 80–100)
NONHDLC SERPL-MCNC: 146 MG/DL (ref ?–130)
OSMOLALITY SERPL CALC.SUM OF ELEC: 282 MOSM/KG (ref 275–295)
PATIENT FASTING Y/N/NP: YES
PATIENT FASTING Y/N/NP: YES
PLATELET # BLD AUTO: 208 10(3)UL (ref 150–450)
POTASSIUM SERPL-SCNC: 3.5 MMOL/L (ref 3.5–5.1)
RBC # BLD AUTO: 5.12 X10(6)UL (ref 4.3–5.7)
SODIUM SERPL-SCNC: 137 MMOL/L (ref 136–145)
T4 FREE SERPL-MCNC: 0.9 NG/DL (ref 0.8–1.7)
TRIGL SERPL-MCNC: 156 MG/DL (ref 30–149)
TSI SER-ACNC: 4.79 MIU/ML (ref 0.36–3.74)
VLDLC SERPL CALC-MCNC: 31 MG/DL (ref 0–30)
WBC # BLD AUTO: 5.3 X10(3) UL (ref 4–11)

## 2020-07-21 PROCEDURE — 3079F DIAST BP 80-89 MM HG: CPT | Performed by: FAMILY MEDICINE

## 2020-07-21 PROCEDURE — 36415 COLL VENOUS BLD VENIPUNCTURE: CPT | Performed by: FAMILY MEDICINE

## 2020-07-21 PROCEDURE — 99213 OFFICE O/P EST LOW 20 MIN: CPT | Performed by: FAMILY MEDICINE

## 2020-07-21 PROCEDURE — 3008F BODY MASS INDEX DOCD: CPT | Performed by: FAMILY MEDICINE

## 2020-07-21 PROCEDURE — 80061 LIPID PANEL: CPT | Performed by: FAMILY MEDICINE

## 2020-07-21 PROCEDURE — 99396 PREV VISIT EST AGE 40-64: CPT | Performed by: FAMILY MEDICINE

## 2020-07-21 PROCEDURE — 80050 GENERAL HEALTH PANEL: CPT | Performed by: FAMILY MEDICINE

## 2020-07-21 PROCEDURE — 3075F SYST BP GE 130 - 139MM HG: CPT | Performed by: FAMILY MEDICINE

## 2020-07-21 PROCEDURE — 84439 ASSAY OF FREE THYROXINE: CPT | Performed by: FAMILY MEDICINE

## 2020-07-21 NOTE — PROGRESS NOTES
Dennise Royal is a 52year old male.     CC:  Patient presents with:  Physical      HPI:  Yearly PX    Last lipid:  Lab Results   Component Value Date    CHOLEST 124 12/13/2018    TRIG 87 12/13/2018    HDL 44 12/13/2018    LDL 63 12/13/2018    VLDL 32 08 peak heart rate of 169 beats per minute and a peak blood pressure of 190/100 mmHg. There were 10.0 METs achieved. The EKG was negative for ischemia.   No ST depression or angina occurred.     Resting echocardiographic images revealed normal chamber sizes a upper chest are unremarkable. No osteolytic or osteoblastic bone lesion. Mild mucosal thickening in the left side of the sphenoid sinus and in the posterior left ethmoid air cells. Mastoid air cells and tympanic cavities are clear. IMPRESSION:    1. GLASSES      Past Surgical History:   Procedure Laterality Date   • APPENDECTOMY     • COLONOSCOPY & POLYPECTOMY  2/16    polyp; tics; repeat 5 yrs (SSA)   • COLONOSCOPY, POSSIBLE BIOPSY, POSSIBLE POLYPECTOMY 12248 N/A 2/9/2016    Performed by Nikita Marcus applicable  EXTREMITIES: No clubbing, cyanosis or edema  RECTAL: not examined  GENITAL: not examined  LYMPH: no supraclavicular nodes  MUSCULOSKELETAL: normal ambulation  NEURO: Awake and alert. Normal speech and articulation. No facial droop or asymmetry. follow-ups on file.       Authorized by Adalgisa Ram M.D.

## 2020-08-15 RX ORDER — ATORVASTATIN CALCIUM 40 MG/1
TABLET, FILM COATED ORAL
Qty: 90 TABLET | Refills: 1 | Status: SHIPPED | OUTPATIENT
Start: 2020-08-15 | End: 2021-02-17

## 2020-08-15 RX ORDER — LOSARTAN POTASSIUM 25 MG/1
TABLET ORAL
Qty: 30 TABLET | Refills: 0 | Status: SHIPPED | OUTPATIENT
Start: 2020-08-15 | End: 2020-09-10

## 2020-08-15 NOTE — TELEPHONE ENCOUNTER
Medication Quantity Refills Start End   LOSARTAN POTASSIUM 25 MG Oral Tab 30 tablet 0 7/16/2020      Last OV 7/21/20 - well adult exam; /88  Future Appointments   Date Time Provider Brennon Franco   8/20/2020  9:45 AM [address] [address]

## 2020-08-20 ENCOUNTER — HOSPITAL ENCOUNTER (OUTPATIENT)
Dept: ULTRASOUND IMAGING | Age: 50
Discharge: HOME | End: 2020-08-20
Attending: FAMILY MEDICINE
Payer: COMMERCIAL

## 2020-08-20 DIAGNOSIS — R22.1 NECK FULLNESS: ICD-10-CM

## 2020-08-20 DIAGNOSIS — R10.11 RUQ PAIN: ICD-10-CM

## 2020-08-20 DIAGNOSIS — E89.0 H/O PARTIAL THYROIDECTOMY: ICD-10-CM

## 2020-08-20 PROCEDURE — 76536 US EXAM OF HEAD AND NECK: CPT | Performed by: FAMILY MEDICINE

## 2020-08-20 PROCEDURE — 76700 US EXAM ABDOM COMPLETE: CPT | Performed by: FAMILY MEDICINE

## 2020-09-10 RX ORDER — LOSARTAN POTASSIUM 25 MG/1
TABLET ORAL
Qty: 90 TABLET | Refills: 0 | Status: SHIPPED | OUTPATIENT
Start: 2020-09-10 | End: 2020-12-06

## 2020-12-06 RX ORDER — LOSARTAN POTASSIUM 25 MG/1
TABLET ORAL
Qty: 90 TABLET | Refills: 1 | Status: SHIPPED | OUTPATIENT
Start: 2020-12-06 | End: 2021-06-10

## 2021-02-17 RX ORDER — ATORVASTATIN CALCIUM 40 MG/1
TABLET, FILM COATED ORAL
Qty: 90 TABLET | Refills: 1 | Status: SHIPPED | OUTPATIENT
Start: 2021-02-17 | End: 2021-08-16

## 2021-02-18 ENCOUNTER — TELEPHONE (OUTPATIENT)
Dept: FAMILY MEDICINE CLINIC | Facility: CLINIC | Age: 51
End: 2021-02-18

## 2021-02-18 NOTE — TELEPHONE ENCOUNTER
Patient advised of the information per Dr. Jac Thomas. Patient was driving and asked for the information to be sent to his my-chart. Information sent.

## 2021-02-18 NOTE — TELEPHONE ENCOUNTER
PT ADV THAT HE IS DUE FOR HIS COLONOSCOPY. DOES PT NEED TO SEE DR OQUENDO OR CAN YOU PLACE REFERRAL FOR GI DR.     LOOKS LIKE PTS LAST COLONOSCOPY WAS WITH DR Marysol Philip 2016    PLEASE ADV    THANK YOU

## 2021-02-18 NOTE — TELEPHONE ENCOUNTER
No need to see me. He can call Dr. Hernandez [facility] office to set it up.   [facility]: Dr. Jyoti Rangel,  [phone]     Thanks

## 2021-04-09 DIAGNOSIS — Z23 NEED FOR VACCINATION: ICD-10-CM

## 2021-04-09 PROCEDURE — 88305 TISSUE EXAM BY PATHOLOGIST: CPT | Performed by: INTERNAL MEDICINE

## 2021-06-10 RX ORDER — LOSARTAN POTASSIUM 25 MG/1
TABLET ORAL
Qty: 90 TABLET | Refills: 0 | Status: SHIPPED | OUTPATIENT
Start: 2021-06-10 | End: 2021-09-13

## 2021-06-10 NOTE — TELEPHONE ENCOUNTER
Please let patient or caregiver know or leave message that refill request for Losartan has/have come from the pharmacy. The refills have been sent. The patient will be due for a yearly physical and fasting labs in 7/2021.  Please help schedule this for him

## 2021-08-16 RX ORDER — ATORVASTATIN CALCIUM 40 MG/1
TABLET, FILM COATED ORAL
Qty: 90 TABLET | Refills: 0 | Status: SHIPPED | OUTPATIENT
Start: 2021-08-16 | End: 2021-11-15

## 2021-09-13 RX ORDER — LOSARTAN POTASSIUM 25 MG/1
TABLET ORAL
Qty: 90 TABLET | Refills: 0 | Status: SHIPPED | OUTPATIENT
Start: 2021-09-13 | End: 2021-12-15

## 2021-09-16 ENCOUNTER — OFFICE VISIT (OUTPATIENT)
Dept: FAMILY MEDICINE CLINIC | Facility: CLINIC | Age: 51
End: 2021-09-16
Payer: COMMERCIAL

## 2021-09-16 VITALS
RESPIRATION RATE: 18 BRPM | BODY MASS INDEX: 35.99 KG/M2 | DIASTOLIC BLOOD PRESSURE: 86 MMHG | HEART RATE: 76 BPM | TEMPERATURE: 98 F | SYSTOLIC BLOOD PRESSURE: 130 MMHG | WEIGHT: 243 LBS | HEIGHT: 69 IN | OXYGEN SATURATION: 99 %

## 2021-09-16 DIAGNOSIS — I10 ESSENTIAL HYPERTENSION: ICD-10-CM

## 2021-09-16 DIAGNOSIS — Z00.00 WELL ADULT EXAM: Primary | ICD-10-CM

## 2021-09-16 DIAGNOSIS — E78.5 HYPERLIPIDEMIA, UNSPECIFIED HYPERLIPIDEMIA TYPE: ICD-10-CM

## 2021-09-16 DIAGNOSIS — E89.0 H/O PARTIAL THYROIDECTOMY: ICD-10-CM

## 2021-09-16 DIAGNOSIS — Z12.5 PROSTATE CANCER SCREENING: ICD-10-CM

## 2021-09-16 LAB
ALT SERPL-CCNC: 48 U/L
ANION GAP SERPL CALC-SCNC: 4 MMOL/L (ref 0–18)
AST SERPL-CCNC: 31 U/L (ref 15–37)
BUN BLD-MCNC: 18 MG/DL (ref 7–18)
CALCIUM BLD-MCNC: 9.2 MG/DL (ref 8.5–10.1)
CHLORIDE SERPL-SCNC: 106 MMOL/L (ref 98–112)
CHOLEST SERPL-MCNC: 236 MG/DL (ref ?–200)
CO2 SERPL-SCNC: 27 MMOL/L (ref 21–32)
COMPLEXED PSA SERPL-MCNC: 0.37 NG/ML (ref ?–4)
CREAT BLD-MCNC: 0.87 MG/DL
ERYTHROCYTE [DISTWIDTH] IN BLOOD BY AUTOMATED COUNT: 12.7 %
GLUCOSE BLD-MCNC: 89 MG/DL (ref 70–99)
HCT VFR BLD AUTO: 45.4 %
HDLC SERPL-MCNC: 57 MG/DL (ref 40–59)
HGB BLD-MCNC: 15.9 G/DL
LDLC SERPL CALC-MCNC: 154 MG/DL (ref ?–100)
MCH RBC QN AUTO: 31.5 PG (ref 26–34)
MCHC RBC AUTO-ENTMCNC: 35 G/DL (ref 31–37)
MCV RBC AUTO: 90.1 FL
NONHDLC SERPL-MCNC: 179 MG/DL (ref ?–130)
OSMOLALITY SERPL CALC.SUM OF ELEC: 285 MOSM/KG (ref 275–295)
PATIENT FASTING Y/N/NP: YES
PATIENT FASTING Y/N/NP: YES
PLATELET # BLD AUTO: 210 10(3)UL (ref 150–450)
POTASSIUM SERPL-SCNC: 4.4 MMOL/L (ref 3.5–5.1)
RBC # BLD AUTO: 5.04 X10(6)UL
SODIUM SERPL-SCNC: 137 MMOL/L (ref 136–145)
T4 FREE SERPL-MCNC: 0.9 NG/DL (ref 0.8–1.7)
TRIGL SERPL-MCNC: 138 MG/DL (ref 30–149)
TSI SER-ACNC: 3.99 MIU/ML (ref 0.36–3.74)
VLDLC SERPL CALC-MCNC: 26 MG/DL (ref 0–30)
WBC # BLD AUTO: 5.1 X10(3) UL (ref 4–11)

## 2021-09-16 PROCEDURE — 80048 BASIC METABOLIC PNL TOTAL CA: CPT | Performed by: FAMILY MEDICINE

## 2021-09-16 PROCEDURE — 3079F DIAST BP 80-89 MM HG: CPT | Performed by: FAMILY MEDICINE

## 2021-09-16 PROCEDURE — 84439 ASSAY OF FREE THYROXINE: CPT | Performed by: FAMILY MEDICINE

## 2021-09-16 PROCEDURE — 84153 ASSAY OF PSA TOTAL: CPT | Performed by: FAMILY MEDICINE

## 2021-09-16 PROCEDURE — 84460 ALANINE AMINO (ALT) (SGPT): CPT | Performed by: FAMILY MEDICINE

## 2021-09-16 PROCEDURE — 84450 TRANSFERASE (AST) (SGOT): CPT | Performed by: FAMILY MEDICINE

## 2021-09-16 PROCEDURE — 80061 LIPID PANEL: CPT | Performed by: FAMILY MEDICINE

## 2021-09-16 PROCEDURE — 3008F BODY MASS INDEX DOCD: CPT | Performed by: FAMILY MEDICINE

## 2021-09-16 PROCEDURE — 3075F SYST BP GE 130 - 139MM HG: CPT | Performed by: FAMILY MEDICINE

## 2021-09-16 PROCEDURE — 84443 ASSAY THYROID STIM HORMONE: CPT | Performed by: FAMILY MEDICINE

## 2021-09-16 PROCEDURE — 85027 COMPLETE CBC AUTOMATED: CPT | Performed by: FAMILY MEDICINE

## 2021-09-16 PROCEDURE — 99396 PREV VISIT EST AGE 40-64: CPT | Performed by: FAMILY MEDICINE

## 2021-09-16 NOTE — PROGRESS NOTES
Elizabeth Grandchild is a 54year old male.     CC:  Patient presents with:  Physical: per pt      HPI:  Yearly PX    Last lipid:  Lab Results   Component Value Date    CHOLEST 197 07/21/2020    TRIG 156 (H) 07/21/2020    HDL 51 07/21/2020     (H) 07/21/ Kenny Perry N/A 2/9/2016    Procedure: COLONOSCOPY, POSSIBLE BIOPSY, POSSIBLE POLYPECTOMY 51253;  Surgeon: Radhika Umanzor MD;  Location: [facility]   • OTHER SURGICAL HISTORY      L shoulder for labral tear   • THYROIDECTOMY Right bruits; no masses; nontender, no G/R/R   PSYCH: alert and oriented x 3; affect appropriate  SKIN: not examined  BREAST: not examined/not applicable  EXTREMITIES: No clubbing, cyanosis or edema  RECTAL: not examined  GENITAL: not examined  LYMPH: no supracl Future          Number of Occurrences: 1          Standing Expiration Date: 9/16/2022      VENIPUNCTURE          Order Specific Question: Release to patient          Answer: Immediate      None    Meds & Refills for this Visit:  Requested Prescriptions

## 2021-11-01 ENCOUNTER — TELEPHONE (OUTPATIENT)
Dept: FAMILY MEDICINE CLINIC | Facility: CLINIC | Age: 51
End: 2021-11-01

## 2021-11-01 NOTE — TELEPHONE ENCOUNTER
Patient called requesting a call back from [facility].     Re: covid vaccine    Please call him back # [phone]

## 2021-11-03 ENCOUNTER — PATIENT MESSAGE (OUTPATIENT)
Dept: FAMILY MEDICINE CLINIC | Facility: CLINIC | Age: 51
End: 2021-11-03

## 2021-11-03 NOTE — TELEPHONE ENCOUNTER
From: Susanne Carlson  To: Senait Fields MD  Sent: 11/3/2021 7:54 AM CDT  Subject: Covid 23 for 13and 15year olds    Have your thoughts changed on Covid 19 shots for kids yet. Just wondering now that it has been approved for 5 and up.  I know you said you

## 2021-11-15 RX ORDER — ATORVASTATIN CALCIUM 40 MG/1
TABLET, FILM COATED ORAL
Qty: 90 TABLET | Refills: 1 | Status: SHIPPED | OUTPATIENT
Start: 2021-11-15

## 2021-12-15 RX ORDER — LOSARTAN POTASSIUM 25 MG/1
TABLET ORAL
Qty: 90 TABLET | Refills: 2 | Status: SHIPPED | OUTPATIENT
Start: 2021-12-15

## 2022-03-16 ENCOUNTER — PATIENT MESSAGE (OUTPATIENT)
Dept: FAMILY MEDICINE CLINIC | Facility: CLINIC | Age: 52
End: 2022-03-16

## 2022-03-17 NOTE — TELEPHONE ENCOUNTER
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 Would it be possible for you to prescribe an anti anxiety medication for him.     thanks  China Goldsmith

## 2022-05-10 ENCOUNTER — OFFICE VISIT (OUTPATIENT)
Dept: FAMILY MEDICINE CLINIC | Facility: CLINIC | Age: 52
End: 2022-05-10
Payer: COMMERCIAL

## 2022-05-10 ENCOUNTER — HOSPITAL ENCOUNTER (OUTPATIENT)
Dept: GENERAL RADIOLOGY | Age: 52
Discharge: HOME | End: 2022-05-10
Attending: FAMILY MEDICINE
Payer: COMMERCIAL

## 2022-05-10 VITALS
DIASTOLIC BLOOD PRESSURE: 88 MMHG | WEIGHT: 259 LBS | HEART RATE: 78 BPM | BODY MASS INDEX: 38 KG/M2 | TEMPERATURE: 98 F | OXYGEN SATURATION: 97 % | SYSTOLIC BLOOD PRESSURE: 132 MMHG

## 2022-05-10 DIAGNOSIS — S99.921A TOE TRAUMA, RIGHT, INITIAL ENCOUNTER: Primary | ICD-10-CM

## 2022-05-10 DIAGNOSIS — S99.921A TOE TRAUMA, RIGHT, INITIAL ENCOUNTER: ICD-10-CM

## 2022-05-10 PROCEDURE — 99214 OFFICE O/P EST MOD 30 MIN: CPT | Performed by: FAMILY MEDICINE

## 2022-05-10 PROCEDURE — 3075F SYST BP GE 130 - 139MM HG: CPT | Performed by: FAMILY MEDICINE

## 2022-05-10 PROCEDURE — 73660 X-RAY EXAM OF TOE(S): CPT | Performed by: FAMILY MEDICINE

## 2022-05-10 PROCEDURE — 3079F DIAST BP 80-89 MM HG: CPT | Performed by: FAMILY MEDICINE

## 2022-05-10 RX ORDER — DOXYCYCLINE HYCLATE 100 MG
100 TABLET ORAL 2 TIMES DAILY
Qty: 20 TABLET | Refills: 0 | Status: SHIPPED | OUTPATIENT
Start: 2022-05-10 | End: 2022-05-20

## 2022-05-19 RX ORDER — ATORVASTATIN CALCIUM 40 MG/1
40 TABLET, FILM COATED ORAL NIGHTLY
Qty: 90 TABLET | Refills: 0 | Status: SHIPPED | OUTPATIENT
Start: 2022-05-19 | End: 2022-08-20

## 2022-06-08 ENCOUNTER — MED REC SCAN ONLY (OUTPATIENT)
Dept: FAMILY MEDICINE CLINIC | Facility: CLINIC | Age: 52
End: 2022-06-08

## 2022-07-08 ENCOUNTER — TELEPHONE (OUTPATIENT)
Dept: FAMILY MEDICINE CLINIC | Facility: CLINIC | Age: 52
End: 2022-07-08

## 2022-07-08 NOTE — TELEPHONE ENCOUNTER
Called HIM @ [address] [place] [address] in [place] [phone] to report the error- left message for Nery FALLON Manager

## 2022-07-08 NOTE — TELEPHONE ENCOUNTER
Please call him and make sure he is OK. This is a bit unorthodox, but, I received an electronic health document from [facility]. It states that he was in the ER in [address] and was admitted with cellulitis. His address on the document is in [facility]. I think the document is for the wrong person. He may want to call Dejan Forrest and see if his information is attached to the wrong person.   Thanks

## 2022-08-20 RX ORDER — ATORVASTATIN CALCIUM 40 MG/1
TABLET, FILM COATED ORAL
Qty: 90 TABLET | Refills: 0 | Status: SHIPPED | OUTPATIENT
Start: 2022-08-20

## 2022-09-22 RX ORDER — LOSARTAN POTASSIUM 25 MG/1
TABLET ORAL
Qty: 90 TABLET | Refills: 0 | Status: SHIPPED | OUTPATIENT
Start: 2022-09-22

## 2022-11-25 RX ORDER — ATORVASTATIN CALCIUM 40 MG/1
TABLET, FILM COATED ORAL
Qty: 90 TABLET | Refills: 0 | Status: SHIPPED | OUTPATIENT
Start: 2022-11-25

## 2022-11-25 NOTE — TELEPHONE ENCOUNTER
Future Appointments   Date Time Provider Brennon Franco   12/21/2022  8:20 AM Cabrera Morgan MD [facility] Mukul Lindsey

## 2022-12-21 ENCOUNTER — OFFICE VISIT (OUTPATIENT)
Dept: FAMILY MEDICINE CLINIC | Facility: CLINIC | Age: 52
End: 2022-12-21
Payer: COMMERCIAL

## 2022-12-21 VITALS
TEMPERATURE: 97 F | OXYGEN SATURATION: 99 % | BODY MASS INDEX: 40.58 KG/M2 | HEART RATE: 73 BPM | DIASTOLIC BLOOD PRESSURE: 100 MMHG | SYSTOLIC BLOOD PRESSURE: 134 MMHG | WEIGHT: 274 LBS | HEIGHT: 69 IN

## 2022-12-21 DIAGNOSIS — Z12.5 PROSTATE CANCER SCREENING: ICD-10-CM

## 2022-12-21 DIAGNOSIS — E78.5 HYPERLIPIDEMIA, UNSPECIFIED HYPERLIPIDEMIA TYPE: ICD-10-CM

## 2022-12-21 DIAGNOSIS — I10 ESSENTIAL HYPERTENSION: ICD-10-CM

## 2022-12-21 DIAGNOSIS — Z00.00 WELL ADULT EXAM: Primary | ICD-10-CM

## 2022-12-21 LAB
ALT SERPL-CCNC: 50 U/L
ANION GAP SERPL CALC-SCNC: 6 MMOL/L (ref 0–18)
AST SERPL-CCNC: 19 U/L (ref 15–37)
BUN BLD-MCNC: 14 MG/DL (ref 7–18)
CALCIUM BLD-MCNC: 9.4 MG/DL (ref 8.5–10.1)
CHLORIDE SERPL-SCNC: 108 MMOL/L (ref 98–112)
CHOLEST SERPL-MCNC: 188 MG/DL (ref ?–200)
CO2 SERPL-SCNC: 26 MMOL/L (ref 21–32)
COMPLEXED PSA SERPL-MCNC: 0.32 NG/ML (ref ?–4)
CREAT BLD-MCNC: 0.83 MG/DL
ERYTHROCYTE [DISTWIDTH] IN BLOOD BY AUTOMATED COUNT: 12.3 %
FASTING PATIENT LIPID ANSWER: YES
FASTING STATUS PATIENT QL REPORTED: YES
GFR SERPLBLD BASED ON 1.73 SQ M-ARVRAT: 107 ML/MIN/1.73M2 (ref 60–?)
GLUCOSE BLD-MCNC: 103 MG/DL (ref 70–99)
HCT VFR BLD AUTO: 45.8 %
HDLC SERPL-MCNC: 50 MG/DL (ref 40–59)
HGB BLD-MCNC: 15.7 G/DL
LDLC SERPL CALC-MCNC: 104 MG/DL (ref ?–100)
MCH RBC QN AUTO: 30.7 PG (ref 26–34)
MCHC RBC AUTO-ENTMCNC: 34.3 G/DL (ref 31–37)
MCV RBC AUTO: 89.6 FL
NONHDLC SERPL-MCNC: 138 MG/DL (ref ?–130)
OSMOLALITY SERPL CALC.SUM OF ELEC: 291 MOSM/KG (ref 275–295)
PLATELET # BLD AUTO: 215 10(3)UL (ref 150–450)
POTASSIUM SERPL-SCNC: 4.1 MMOL/L (ref 3.5–5.1)
RBC # BLD AUTO: 5.11 X10(6)UL
SODIUM SERPL-SCNC: 140 MMOL/L (ref 136–145)
T4 FREE SERPL-MCNC: 0.9 NG/DL (ref 0.8–1.7)
TRIGL SERPL-MCNC: 200 MG/DL (ref 30–149)
TSI SER-ACNC: 3.33 MIU/ML (ref 0.36–3.74)
VLDLC SERPL CALC-MCNC: 34 MG/DL (ref 0–30)
WBC # BLD AUTO: 5.3 X10(3) UL (ref 4–11)

## 2022-12-21 PROCEDURE — 3075F SYST BP GE 130 - 139MM HG: CPT | Performed by: FAMILY MEDICINE

## 2022-12-21 PROCEDURE — 3008F BODY MASS INDEX DOCD: CPT | Performed by: FAMILY MEDICINE

## 2022-12-21 PROCEDURE — 85027 COMPLETE CBC AUTOMATED: CPT | Performed by: FAMILY MEDICINE

## 2022-12-21 PROCEDURE — 84153 ASSAY OF PSA TOTAL: CPT | Performed by: FAMILY MEDICINE

## 2022-12-21 PROCEDURE — 84460 ALANINE AMINO (ALT) (SGPT): CPT | Performed by: FAMILY MEDICINE

## 2022-12-21 PROCEDURE — 84439 ASSAY OF FREE THYROXINE: CPT | Performed by: FAMILY MEDICINE

## 2022-12-21 PROCEDURE — 3080F DIAST BP >= 90 MM HG: CPT | Performed by: FAMILY MEDICINE

## 2022-12-21 PROCEDURE — 84450 TRANSFERASE (AST) (SGOT): CPT | Performed by: FAMILY MEDICINE

## 2022-12-21 PROCEDURE — 80048 BASIC METABOLIC PNL TOTAL CA: CPT | Performed by: FAMILY MEDICINE

## 2022-12-21 PROCEDURE — 84443 ASSAY THYROID STIM HORMONE: CPT | Performed by: FAMILY MEDICINE

## 2022-12-21 PROCEDURE — 80061 LIPID PANEL: CPT | Performed by: FAMILY MEDICINE

## 2022-12-21 PROCEDURE — 99396 PREV VISIT EST AGE 40-64: CPT | Performed by: FAMILY MEDICINE

## 2022-12-21 RX ORDER — LOSARTAN POTASSIUM 50 MG/1
50 TABLET ORAL DAILY
Qty: 90 TABLET | Refills: 0 | Status: SHIPPED | OUTPATIENT
Start: 2022-12-21

## 2023-02-21 ENCOUNTER — OFFICE VISIT (OUTPATIENT)
Dept: FAMILY MEDICINE CLINIC | Facility: CLINIC | Age: 53
End: 2023-02-21
Payer: COMMERCIAL

## 2023-02-21 VITALS
WEIGHT: 282 LBS | BODY MASS INDEX: 42 KG/M2 | DIASTOLIC BLOOD PRESSURE: 100 MMHG | TEMPERATURE: 98 F | OXYGEN SATURATION: 98 % | SYSTOLIC BLOOD PRESSURE: 150 MMHG | HEART RATE: 82 BPM

## 2023-02-21 DIAGNOSIS — I10 ESSENTIAL HYPERTENSION: Primary | ICD-10-CM

## 2023-02-21 PROCEDURE — 3077F SYST BP >= 140 MM HG: CPT | Performed by: FAMILY MEDICINE

## 2023-02-21 PROCEDURE — 99214 OFFICE O/P EST MOD 30 MIN: CPT | Performed by: FAMILY MEDICINE

## 2023-02-21 PROCEDURE — 3080F DIAST BP >= 90 MM HG: CPT | Performed by: FAMILY MEDICINE

## 2023-02-21 RX ORDER — LOSARTAN POTASSIUM AND HYDROCHLOROTHIAZIDE 12.5; 1 MG/1; MG/1
1 TABLET ORAL DAILY
Qty: 90 TABLET | Refills: 0 | Status: SHIPPED | OUTPATIENT
Start: 2023-02-21

## 2023-03-07 ENCOUNTER — OFFICE VISIT (OUTPATIENT)
Dept: FAMILY MEDICINE CLINIC | Facility: CLINIC | Age: 53
End: 2023-03-07
Payer: COMMERCIAL

## 2023-03-07 VITALS
DIASTOLIC BLOOD PRESSURE: 88 MMHG | OXYGEN SATURATION: 99 % | HEART RATE: 86 BPM | TEMPERATURE: 98 F | BODY MASS INDEX: 41 KG/M2 | WEIGHT: 278 LBS | SYSTOLIC BLOOD PRESSURE: 128 MMHG

## 2023-03-07 DIAGNOSIS — I10 ESSENTIAL HYPERTENSION: Primary | ICD-10-CM

## 2023-03-07 PROCEDURE — 99213 OFFICE O/P EST LOW 20 MIN: CPT | Performed by: FAMILY MEDICINE

## 2023-03-07 PROCEDURE — 3079F DIAST BP 80-89 MM HG: CPT | Performed by: FAMILY MEDICINE

## 2023-03-07 PROCEDURE — 3074F SYST BP LT 130 MM HG: CPT | Performed by: FAMILY MEDICINE

## 2023-03-09 ENCOUNTER — TELEPHONE (OUTPATIENT)
Dept: FAMILY MEDICINE CLINIC | Facility: CLINIC | Age: 53
End: 2023-03-09

## 2023-03-09 LAB
BUN: 13 MG/DL (ref 7–25)
CALCIUM: 9.7 MG/DL (ref 8.6–10.3)
CARBON DIOXIDE: 29 MMOL/L (ref 20–32)
CHLORIDE: 104 MMOL/L (ref 98–110)
CREATININE: 0.82 MG/DL (ref 0.7–1.3)
EGFR: 107 ML/MIN/1.73M2
GLUCOSE: 98 MG/DL (ref 65–139)
POTASSIUM: 4.2 MMOL/L (ref 3.5–5.3)
SODIUM: 141 MMOL/L (ref 135–146)

## 2023-03-09 NOTE — TELEPHONE ENCOUNTER
Please let patient or caregiver know or leave message that:   His sugar, electrolyte and kidney blood tests done at [facility] were normal. Nice labs.   Thanks

## 2023-03-10 ENCOUNTER — MED REC SCAN ONLY (OUTPATIENT)
Dept: FAMILY MEDICINE CLINIC | Facility: CLINIC | Age: 53
End: 2023-03-10

## 2023-03-13 ENCOUNTER — TELEPHONE (OUTPATIENT)
Dept: FAMILY MEDICINE CLINIC | Facility: CLINIC | Age: 53
End: 2023-03-13

## 2023-03-13 RX ORDER — AZITHROMYCIN 250 MG/1
TABLET, FILM COATED ORAL
Qty: 6 TABLET | Refills: 0 | Status: SHIPPED | OUTPATIENT
Start: 2023-03-13 | End: 2023-03-18

## 2023-03-13 NOTE — TELEPHONE ENCOUNTER
Please let patient or caregiver know or leave message that:   Virgen Isaac sent to [facility]. Hope it helps.   Thanks

## 2023-03-13 NOTE — TELEPHONE ENCOUNTER
Left detailed message to voicemail (per verbal release form consent with confirmed identifying message) of doctor's note below. Patient was advised to call office back with any questions/concerns.

## 2023-03-13 NOTE — TELEPHONE ENCOUNTER
Patient seen last week  He was told to call if symptoms got worse    Head cold has now gone into chest    Productive, colorful cough    Requests rx    Please adv  Thank you

## 2023-11-08 ENCOUNTER — ORDER TRANSCRIPTION (OUTPATIENT)
Dept: ADMINISTRATIVE | Facility: HOSPITAL | Age: 53
End: 2023-11-08

## 2023-11-08 DIAGNOSIS — Z13.6 SCREENING FOR CARDIOVASCULAR CONDITION: Primary | ICD-10-CM

## 2023-11-11 ENCOUNTER — HOSPITAL ENCOUNTER (OUTPATIENT)
Dept: CT IMAGING | Age: 53
Discharge: HOME | End: 2023-11-11
Attending: FAMILY MEDICINE

## 2023-11-11 DIAGNOSIS — Z13.6 SCREENING FOR CARDIOVASCULAR CONDITION: ICD-10-CM

## 2023-11-17 PROBLEM — R93.1 ELEVATED CORONARY ARTERY CALCIUM SCORE: Status: ACTIVE | Noted: 2023-11-16

## 2023-11-17 PROBLEM — I10 HYPERTENSION: Status: ACTIVE | Noted: 2017-06-22

## 2023-12-14 RX ORDER — ATORVASTATIN CALCIUM 40 MG/1
40 TABLET, FILM COATED ORAL NIGHTLY
Qty: 90 TABLET | Refills: 1 | Status: SHIPPED | OUTPATIENT
Start: 2023-12-14

## 2023-12-18 ENCOUNTER — MED REC SCAN ONLY (OUTPATIENT)
Dept: FAMILY MEDICINE CLINIC | Facility: CLINIC | Age: 53
End: 2023-12-18

## 2024-01-10 ENCOUNTER — OFFICE VISIT (OUTPATIENT)
Dept: FAMILY MEDICINE CLINIC | Facility: CLINIC | Age: 54
End: 2024-01-10
Payer: COMMERCIAL

## 2024-01-10 VITALS
TEMPERATURE: 98 F | HEART RATE: 78 BPM | SYSTOLIC BLOOD PRESSURE: 122 MMHG | RESPIRATION RATE: 16 BRPM | OXYGEN SATURATION: 98 % | DIASTOLIC BLOOD PRESSURE: 87 MMHG | BODY MASS INDEX: 41 KG/M2 | WEIGHT: 276 LBS

## 2024-01-10 DIAGNOSIS — Z12.5 PROSTATE CANCER SCREENING: ICD-10-CM

## 2024-01-10 DIAGNOSIS — I10 ESSENTIAL HYPERTENSION: ICD-10-CM

## 2024-01-10 DIAGNOSIS — E78.5 HYPERLIPIDEMIA, UNSPECIFIED HYPERLIPIDEMIA TYPE: ICD-10-CM

## 2024-01-10 DIAGNOSIS — Z00.00 WELL ADULT EXAM: Primary | ICD-10-CM

## 2024-01-10 PROCEDURE — 3074F SYST BP LT 130 MM HG: CPT | Performed by: FAMILY MEDICINE

## 2024-01-10 PROCEDURE — 3079F DIAST BP 80-89 MM HG: CPT | Performed by: FAMILY MEDICINE

## 2024-01-10 PROCEDURE — 99396 PREV VISIT EST AGE 40-64: CPT | Performed by: FAMILY MEDICINE

## 2024-01-10 NOTE — PROGRESS NOTES
Elver Rosenebrg is a 53 year old male.    CC:    Chief Complaint   Patient presents with    Physical       HPI:  Yearly PX    Last PSA:   Recent Results (from the past [id number] hour(s))   PSA Screen    Collection Time: 12/21/22  9:16 AM   Result Value Ref Range    Prostate Specific Antigen Screen 0.32 <=4.00 ng/mL     *Note: Due to a large number of results and/or encounters for the requested time period, some results have not been displayed. A complete set of results can be found in Results Review.        Last lipid:  Lab Results   Component Value Date    CHOLEST 188 12/21/2022    TRIG 200 (H) 12/21/2022    HDL 50 12/21/2022     (H) 12/21/2022    VLDL 34 (H) 12/21/2022    TCHDLRATIO 2.8 12/13/2018    NONHDLC 138 (H) 12/21/2022   Lipids followed by Cardiology. Leon says he had lipids done at [facility] 2 weeks ago. Results are unavailable to me. Had abnormal CT heart score > 1000 in 12/2023. Seen by Cardiology. Cardiac stress testing was negative. Cardiology recommended continuing statin, ARB/hydrochlorothiazide and an aspirin.     Last Colon Cancer screening: Colonoscopy  04/09/2021 to be repeated 2026      Immunization History   Administered Date(s) Administered    Covid-19 Vaccine Moderna 100 mcg/0.5 ml 03/25/2021, 04/22/2021    Covid-19 Vaccine Pfizer 30 mcg/0.3 ml 12/17/2021    TDAP 12/13/2006, 07/14/2017        Allergies:  Allergies   Allergen Reactions    Penicillins HIVES      Current Meds:  Current Outpatient Medications   Medication Sig Dispense Refill    atorvastatin 40 MG Oral Tab Take 1 tablet (40 mg total) by mouth nightly. 90 tablet 1    aspirin 81 MG Oral Chew Tab Chew 1 tablet (81 mg total) by mouth daily.      Losartan Potassium-HCTZ 100-12.5 MG Oral Tab Take 1 tablet by mouth daily. 90 tablet 2        History:  Past Medical History:   Diagnosis Date    Colon polyp     Disorder of thyroid     CYST GROWING ON RT     Glucose intolerance (impaired glucose tolerance)     History of blood transfusion      AS INFANT    Hyperlipidemia     Labral tear of shoulder     L    Pulmonary nodule 11/10/2023    3mm and 4 mm in the RML, incidental finding on CT heart score    Visual impairment     GLASSES      Past Surgical History:   Procedure Laterality Date    APPENDECTOMY      COLONOSCOPY  2021    Diya, +TA polyp, +HP polyp, rpt 5 years    COLONOSCOPY & POLYPECTOMY      polyp; tics; repeat 5 yrs (SSA)    COLONOSCOPY,REMV LESN,SNARE N/A 2016    Procedure: COLONOSCOPY, POSSIBLE BIOPSY, POSSIBLE POLYPECTOMY 29834;  Surgeon: Kareem Keane MD;  Location: [facility]    OTHER SURGICAL HISTORY      L shoulder for labral tear    THYROIDECTOMY Right     Dr. Garcia, 2016      Family History   Problem Relation Age of Onset    Hypertension Father     Other (hyperlipidemia [Other]) Mother       Family Status   Relation Status    Fa  at age 58        prostate cancer    Mo Alive      Social History     Socioeconomic History    Marital status:    Tobacco Use    Smoking status: Never    Smokeless tobacco: Never   Vaping Use    Vaping Use: Some days   Substance and Sexual Activity    Alcohol use: Yes     Alcohol/week: 3.0 standard drinks of alcohol     Types: 3 Cans of beer per week     Comment: occasional    Drug use: Yes     Types: Cannabis        ROS:  General: energy level stable  Cardiovascular/Pulses: Denies exertional chest pain or pressure  GI: Denies hematochezia   (male): Denies hematuria    Vitals: /87   Pulse 78   Temp 97.8 °F (36.6 °C)   Resp 16   Wt 276 lb (125.2 kg)   SpO2 98%   BMI 40.76 kg/m²    Reviewed by STERLING Delatorre M.D.    Physical Exam:  GEN: well developed, well nourished, in no apparent distress  EYE: B conjunctiva and lids normal  HENT: B pinnas, external auditory canals and tympanic membranes are normal.   NECK: No lymphadenopathy, thyromegaly or masses  CAR: S1, S2 normal, RRR; no S3, no S4; no click; murmur negative  PULM: clear to auscultation B, no accessory  muscle use  GI: normal active BS+, soft, nondistended; no HSM; no masses; no bruits; no masses; nontender, no G/R/R   PSYCH: alert and oriented x 3; affect appropriate  SKIN: not examined  BREAST: not examined/not applicable  EXTREMITIES: No clubbing, cyanosis or edema  RECTAL: not examined  GENITAL: not examined  LYMPH: no supraclavicular nodes  MUSCULOSKELETAL: normal ambulation  NEURO: Awake and alert. Normal speech and articulation. No facial droop or asymmetry. Moving all extremities equally. Symmetric B patellar DTRs    ASSESSMENT AND PLAN    1. Well adult exam  Await results   Defers immunizations today  - ALT(SGPT)  - AST (SGOT)  - Basic Metabolic Panel (8)  - CBC, Platelet; No Differential  - PSA Total, Diagnostic  - TSH W Reflex To Free T4    2. Essential hypertension  Stable   Continue present medications   Await results     - ALT(SGPT)  - AST (SGOT)  - Basic Metabolic Panel (8)  - CBC, Platelet; No Differential  - PSA Total, Diagnostic  - TSH W Reflex To Free T4    3. Hyperlipidemia, unspecified hyperlipidemia type  Continue present medications   Followed by Cardiology    4. Prostate cancer screening  Await results     - PSA Total, Diagnostic      No follow-ups on file.    Orders for this visit:    Orders Placed This Encounter   Procedures    ALT(SGPT)     Standing Status:   Future     Number of Occurrences:   1     Standing Expiration Date:   1/10/2025    AST (SGOT)     Standing Status:   Future     Number of Occurrences:   1     Standing Expiration Date:   1/10/2025    Basic Metabolic Panel (8)     Standing Status:   Future     Number of Occurrences:   1     Standing Expiration Date:   1/10/2025    CBC, Platelet; No Differential     Standing Status:   Future     Number of Occurrences:   1     Standing Expiration Date:   1/10/2025    PSA Total, Diagnostic     Standing Status:   Future     Number of Occurrences:   1     Standing Expiration Date:   1/10/2025    TSH W Reflex To Free T4     Standing Status:    Future     Number of Occurrences:   1     Standing Expiration Date:   1/10/2025       None    Meds & Refills for this Visit:  Requested Prescriptions      No prescriptions requested or ordered in this encounter             Authorized by Kwaku Delatorre M.D.

## 2024-01-11 ENCOUNTER — MED REC SCAN ONLY (OUTPATIENT)
Dept: FAMILY MEDICINE CLINIC | Facility: CLINIC | Age: 54
End: 2024-01-11

## 2024-01-11 LAB
ALT: 56 U/L (ref 9–46)
AST: 28 U/L (ref 10–35)
BUN: 16 MG/DL (ref 7–25)
CALCIUM: 10 MG/DL (ref 8.6–10.3)
CARBON DIOXIDE: 28 MMOL/L (ref 20–32)
CHLORIDE: 102 MMOL/L (ref 98–110)
CREATININE: 0.85 MG/DL (ref 0.7–1.3)
EGFR: 105 ML/MIN/1.73M2
GLUCOSE: 94 MG/DL (ref 65–99)
HEMATOCRIT: 46.8 % (ref 38.5–50)
HEMOGLOBIN: 16.6 G/DL (ref 13.2–17.1)
MCH: 32 PG (ref 27–33)
MCHC: 35.5 G/DL (ref 32–36)
MCV: 90.3 FL (ref 80–100)
MPV: 9.9 FL (ref 7.5–12.5)
PLATELET COUNT: 231 THOUSAND/UL (ref 140–400)
POTASSIUM: 4.2 MMOL/L (ref 3.5–5.3)
PSA, TOTAL: 0.29 NG/ML
RDW: 12.9 % (ref 11–15)
RED BLOOD CELL COUNT: 5.18 MILLION/UL (ref 4.2–5.8)
SODIUM: 139 MMOL/L (ref 135–146)
TSH W/REFLEX TO FT4: 3.43 MIU/L (ref 0.4–4.5)
WHITE BLOOD CELL COUNT: 5.9 THOUSAND/UL (ref 3.8–10.8)

## 2024-02-23 RX ORDER — LOSARTAN POTASSIUM AND HYDROCHLOROTHIAZIDE 12.5; 1 MG/1; MG/1
1 TABLET ORAL DAILY
Qty: 90 TABLET | Refills: 3 | Status: SHIPPED | OUTPATIENT
Start: 2024-02-23

## 2024-06-21 RX ORDER — ATORVASTATIN CALCIUM 40 MG/1
40 TABLET, FILM COATED ORAL NIGHTLY
Qty: 90 TABLET | Refills: 1 | Status: SHIPPED | OUTPATIENT
Start: 2024-06-21

## 2024-07-23 ENCOUNTER — OFFICE VISIT (OUTPATIENT)
Dept: FAMILY MEDICINE CLINIC | Facility: CLINIC | Age: 54
End: 2024-07-23
Payer: COMMERCIAL

## 2024-07-23 VITALS
TEMPERATURE: 98 F | DIASTOLIC BLOOD PRESSURE: 100 MMHG | WEIGHT: 265.81 LBS | BODY MASS INDEX: 39 KG/M2 | RESPIRATION RATE: 18 BRPM | HEART RATE: 95 BPM | SYSTOLIC BLOOD PRESSURE: 150 MMHG | OXYGEN SATURATION: 96 %

## 2024-07-23 DIAGNOSIS — I10 ESSENTIAL HYPERTENSION: ICD-10-CM

## 2024-07-23 DIAGNOSIS — M54.6 ACUTE RIGHT-SIDED THORACIC BACK PAIN: Primary | ICD-10-CM

## 2024-07-23 PROCEDURE — 3080F DIAST BP >= 90 MM HG: CPT | Performed by: FAMILY MEDICINE

## 2024-07-23 PROCEDURE — 3077F SYST BP >= 140 MM HG: CPT | Performed by: FAMILY MEDICINE

## 2024-07-23 PROCEDURE — 99214 OFFICE O/P EST MOD 30 MIN: CPT | Performed by: FAMILY MEDICINE

## 2024-07-23 RX ORDER — CYCLOBENZAPRINE HCL 10 MG
10 TABLET ORAL 3 TIMES DAILY PRN
Qty: 30 TABLET | Refills: 0 | Status: SHIPPED | OUTPATIENT
Start: 2024-07-23 | End: 2024-08-12

## 2024-07-23 RX ORDER — AMLODIPINE BESYLATE 2.5 MG/1
2.5 TABLET ORAL DAILY
Qty: 90 TABLET | Refills: 0 | Status: SHIPPED | OUTPATIENT
Start: 2024-07-23

## 2024-07-23 NOTE — PROGRESS NOTES
Elver Rosenberg is a 54 year old male.    CC:    Chief Complaint   Patient presents with    Back Pain     Started Sunday afternoon, elevated blood pressure          HPI:  R thoracic back pain exacerbation 2 days ago. It radiates to the R chest. Seemed to start after washing a golf cart. He has had a low grade R thoracic back pain for many years after hurting it in a fitness class. He also experienced dizziness and sweats while he was washing the cart. Leon then went to dinner after washing the cart and had non bloody diarrhea after dinner. The diarrhea is gone. He noted BP to be 170s/100s the past few days. He is taking BP med as directed. He has been under much stress at work and does drink a decent amount of alcohol on the weekends. He did have a [facility] cardiac stress test in 12/2023 that was normal.       Allergies   Allergen Reactions    Penicillins HIVES      Current Meds:  Current Outpatient Medications   Medication Sig Dispense Refill    ATORVASTATIN 40 MG Oral Tab TAKE 1 TABLET BY MOUTH EVERY DAY AT NIGHT 90 tablet 1    Losartan Potassium-HCTZ 100-12.5 MG Oral Tab TAKE 1 TABLET BY MOUTH EVERY DAY 90 tablet 3    aspirin 81 MG Oral Chew Tab Chew 1 tablet (81 mg total) by mouth daily.          History:  Past Medical History:    Colon polyp    Disorder of thyroid    CYST GROWING ON RT     Fatty liver    Mild per ultrasound report    Glucose intolerance (impaired glucose tolerance)    History of blood transfusion    AS INFANT    Hyperlipidemia    Labral tear of shoulder    L    Pulmonary nodule    3mm and 4 mm in the RML, incidental finding on CT heart score    Visual impairment    GLASSES      Past Surgical History:   Procedure Laterality Date    Appendectomy      Colonoscopy  04/2021    Diya, +TA polyp, +HP polyp, rpt 5 years    Colonoscopy & polypectomy  2/16    polyp; tics; repeat 5 yrs (SSA)    Colonoscopy,mark betancur,snare N/A 2/9/2016    Procedure: COLONOSCOPY, POSSIBLE BIOPSY, POSSIBLE POLYPECTOMY  [id number];  Surgeon: Kareem Keane MD;  Location: [facility]    Other surgical history      L shoulder for labral tear    Thyroidectomy Right     Dr. Garcia, 2016      Family History   Problem Relation Age of Onset    Hypertension Father     Other (hyperlipidemia [Other]) Mother       Family Status   Relation Status    Fa  at age 58        prostate cancer    Mo Alive      Social History     Socioeconomic History    Marital status:    Tobacco Use    Smoking status: Never    Smokeless tobacco: Never   Vaping Use    Vaping status: Some Days   Substance and Sexual Activity    Alcohol use: Yes     Alcohol/week: 3.0 standard drinks of alcohol     Types: 3 Cans of beer per week     Comment: occasional    Drug use: Yes     Types: Cannabis        ROS:  General: energy a bit lower   Skin/Breast: Denies: rash    Vitals: BP (!) 150/100   Pulse 95   Temp 97.6 °F (36.4 °C)   Resp 18   Wt 265 lb 12.8 oz (120.6 kg)   SpO2 96%   BMI 39.25 kg/m²     Reviewed by STERLING Delatorre M.D.    Physical Exam:  GEN: well developed, well nourished, in no apparent distress  EYE: B conjunctiva and lids normal  HENT: B pinnas, external auditory canals and tympanic membranes are normal. No oral lesions.   NECK: No lymphadenopathy, thyromegaly or masses  CAR: S1, S2 normal, RRR; no S3, no S4; no click; murmur negative  PULM: clear to auscultation B, no accessory muscle use  GI: not examined  PSYCH: alert and oriented x 3; affect appropriate  SKIN: no rashes, no suspicious lesions on back  EXTREMITIES: No clubbing, cyanosis or edema  GENITAL: not examined  LYMPH: no supraclavicular nodes  NEURO: Awake and alert. Normal speech and articulation. No facial droop or asymmetry. Moving all extremities equally.  MS: + R mid para thoracic spasm and tenderness    ASSESSMENT AND PLAN    1. Acute right-sided thoracic back pain  Flexeril as directed     2. Essential hypertension  Add amlodipine 2.5 mg every day   F/u in 2 weeks      No  follow-ups on file.    Orders for this visit:    No orders of the defined types were placed in this encounter.      None    Meds & Refills for this Visit:  Requested Prescriptions     Signed Prescriptions Disp Refills    amLODIPine 2.5 MG Oral Tab 90 tablet 0     Sig: Take 1 tablet (2.5 mg total) by mouth daily.    cyclobenzaprine 10 MG Oral Tab 30 tablet 0     Sig: Take 1 tablet (10 mg total) by mouth 3 (three) times daily as needed for Muscle spasms.             Authorized by Kwaku Delatorre M.D.

## 2024-08-06 ENCOUNTER — OFFICE VISIT (OUTPATIENT)
Dept: FAMILY MEDICINE CLINIC | Facility: CLINIC | Age: 54
End: 2024-08-06
Payer: COMMERCIAL

## 2024-08-06 VITALS
SYSTOLIC BLOOD PRESSURE: 126 MMHG | TEMPERATURE: 98 F | BODY MASS INDEX: 37.8 KG/M2 | RESPIRATION RATE: 18 BRPM | DIASTOLIC BLOOD PRESSURE: 82 MMHG | HEIGHT: 70 IN | OXYGEN SATURATION: 99 % | HEART RATE: 91 BPM | WEIGHT: 264 LBS

## 2024-08-06 DIAGNOSIS — I10 HYPERTENSION, UNSPECIFIED TYPE: ICD-10-CM

## 2024-08-06 DIAGNOSIS — M54.50 LUMBAR PAIN: ICD-10-CM

## 2024-08-06 DIAGNOSIS — M54.6 ACUTE RIGHT-SIDED THORACIC BACK PAIN: Primary | ICD-10-CM

## 2024-08-06 PROCEDURE — 3074F SYST BP LT 130 MM HG: CPT | Performed by: FAMILY MEDICINE

## 2024-08-06 PROCEDURE — 99214 OFFICE O/P EST MOD 30 MIN: CPT | Performed by: FAMILY MEDICINE

## 2024-08-06 PROCEDURE — 3079F DIAST BP 80-89 MM HG: CPT | Performed by: FAMILY MEDICINE

## 2024-08-06 PROCEDURE — 3008F BODY MASS INDEX DOCD: CPT | Performed by: FAMILY MEDICINE

## 2024-08-06 NOTE — PROGRESS NOTES
Elver Rosenberg is a 54 year old male.    CC:    Chief Complaint   Patient presents with    Follow - Up     Here for 2 weeks follow up on high blood pressure        HPI:  F/u R sided thoracic pain. It is feeling better. Some days he is pain free. He uses Flexeril prn with good results. He notes that is helps his chronic R low back pain as well.    F/u HTN. Started on amlodipine at last visit. No home BP readings to review. He feels it may have caused him some insomnia.  Allergies:  Allergies   Allergen Reactions    Penicillins HIVES      Current Meds:  Current Outpatient Medications   Medication Sig Dispense Refill    amLODIPine 2.5 MG Oral Tab Take 1 tablet (2.5 mg total) by mouth daily. 90 tablet 0    cyclobenzaprine 10 MG Oral Tab Take 1 tablet (10 mg total) by mouth 3 (three) times daily as needed for Muscle spasms. 30 tablet 0    ATORVASTATIN 40 MG Oral Tab TAKE 1 TABLET BY MOUTH EVERY DAY AT NIGHT 90 tablet 1    Losartan Potassium-HCTZ 100-12.5 MG Oral Tab TAKE 1 TABLET BY MOUTH EVERY DAY 90 tablet 3    aspirin 81 MG Oral Chew Tab Chew 1 tablet (81 mg total) by mouth daily.          History:  Past Medical History:    Colon polyp    Disorder of thyroid    CYST GROWING ON RT     Fatty liver    Mild per ultrasound report    Glucose intolerance (impaired glucose tolerance)    History of blood transfusion    AS INFANT    Hyperlipidemia    Labral tear of shoulder    L    Pulmonary nodule    3mm and 4 mm in the RML, incidental finding on CT heart score    Visual impairment    GLASSES      Past Surgical History:   Procedure Laterality Date    Appendectomy      Colonoscopy  04/2021    Diya, +TA polyp, +HP polyp, rpt 5 years    Colonoscopy & polypectomy  2/16    polyp; tics; repeat 5 yrs (SSA)    Colonoscopy,remv lesn,snare N/A 2/9/2016    Procedure: COLONOSCOPY, POSSIBLE BIOPSY, POSSIBLE POLYPECTOMY 52010;  Surgeon: Kareem Keane MD;  Location: [facility]    Other surgical history      L shoulder for  labral tear    Thyroidectomy Right     Dr. Garcia, 2016      Family History   Problem Relation Age of Onset    Hypertension Father     Other (hyperlipidemia [Other]) Mother       Family Status   Relation Status    Fa  at age 58        prostate cancer    Mo Alive      Social History     Socioeconomic History    Marital status:    Tobacco Use    Smoking status: Never    Smokeless tobacco: Never   Vaping Use    Vaping status: Some Days    Substances: Nicotine    Devices: Disposable   Substance and Sexual Activity    Alcohol use: Yes     Alcohol/week: 3.0 standard drinks of alcohol     Types: 3 Cans of beer per week     Comment: occasional    Drug use: Yes     Types: Cannabis        ROS:  General: energy level stable      Vitals: /82   Pulse 91   Temp 98.2 °F (36.8 °C) (Temporal)   Resp 18   Ht 5' 10\" (1.778 m)   Wt 264 lb (119.7 kg)   SpO2 99%   BMI 37.88 kg/m²    Reviewed by STERLING Delatorre M.D.    Physical Exam:  GEN: well developed, well nourished, in no apparent distress  EYE: B conjunctiva and lids normal  HENT: No oral lesions. B pinnas, external auditory canals and tympanic membranes are normal.   NECK: No lymphadenopathy, thyromegaly or masses  CAR: S1, S2 normal, RRR; no S3, no S4; no click; murmur negative  PULM: clear to auscultation B, no accessory muscle use  GI: not examined  PSYCH: alert and oriented x 3; affect appropriate  SKIN: not examined  EXTREMITIES: No clubbing, cyanosis or edema  GENITAL: not examined  LYMPH: no supraclavicular nodes  NEURO: Awake and alert. Normal speech and articulation. No facial droop or asymmetry. Moving all extremities equally.  MS: + R mid parathoracic spasm and tenderness    ASSESSMENT AND PLAN    1. Acute right-sided thoracic back pain  Continue Flexeril prn  - Physical Therapy Referral - [facility]    F/u prn once PT completed    2. Lumbar pain  As in 1  - Physical Therapy Referral - [facility]    3. Hypertension, unspecified  type  Stable   Continue present medications   F/u in 1/2025 at the time of yearly physical      No follow-ups on file.    Orders for this visit:    No orders of the defined types were placed in this encounter.      OP REFERRAL TO [facility]    Meds & Refills for this Visit:  Requested Prescriptions      No prescriptions requested or ordered in this encounter             Authorized by Kwaku Delatorre M.D.

## 2024-09-27 NOTE — TELEPHONE ENCOUNTER
Please let patient or caregiver know or leave message that refill request for Atorvastatin has/have come from the pharmacy. The refills have been sent. The patient is due for a yearly physical and fasting labs.  Please help schedule this in the next week o
Spoke to pt advising that [facility] sent refill. Pt scheduled px with fasting labs for 9/3/21. Verbally understood with no further questions.
Name band;

## 2024-10-18 RX ORDER — AMLODIPINE BESYLATE 2.5 MG/1
2.5 TABLET ORAL DAILY
Qty: 90 TABLET | Refills: 1 | Status: SHIPPED | OUTPATIENT
Start: 2024-10-18

## 2024-12-02 ENCOUNTER — TELEMEDICINE (OUTPATIENT)
Dept: FAMILY MEDICINE CLINIC | Facility: CLINIC | Age: 54
End: 2024-12-02
Payer: COMMERCIAL

## 2024-12-02 DIAGNOSIS — R51.9 FRONTAL HEADACHE: ICD-10-CM

## 2024-12-02 DIAGNOSIS — J02.9 SORE THROAT: Primary | ICD-10-CM

## 2024-12-02 RX ORDER — AZITHROMYCIN 250 MG/1
TABLET, FILM COATED ORAL
Qty: 6 TABLET | Refills: 0 | Status: SHIPPED | OUTPATIENT
Start: 2024-12-02 | End: 2024-12-07

## 2024-12-02 RX ORDER — AMLODIPINE BESYLATE 5 MG/1
5 TABLET ORAL DAILY
COMMUNITY
Start: 2024-10-21

## 2024-12-02 NOTE — PROGRESS NOTES
My Chart/ Video/Telephone Visit Check-In    Elver Rosenberg and/or caregiver verbally consents a video Check-In service on 12/02/24.  Patient understands and accepts financial responsibility for any deductible, co-insurance and/or co-pays associated with this service.    Duration of the service including reviewing the chart, engaging with the patient and giving medical guidance: 10 minutes    Elver Rosenberg is a 54 year old male.    CC:  ST    HPI:  ST for 5 days. Voice is hoarse. Has had chills but no temp taken. Energy lower. Also with frontal headache. OTC therapies not helping.   Allergies:  Allergies[1]   Current Meds:  Current Outpatient Medications   Medication Sig Dispense Refill    amLODIPine 5 MG Oral Tab Take 1 tablet (5 mg total) by mouth daily.      ATORVASTATIN 40 MG Oral Tab TAKE 1 TABLET BY MOUTH EVERY DAY AT NIGHT 90 tablet 1    Losartan Potassium-HCTZ 100-12.5 MG Oral Tab TAKE 1 TABLET BY MOUTH EVERY DAY 90 tablet 3    aspirin 81 MG Oral Chew Tab Chew 1 tablet (81 mg total) by mouth daily.          History:  Past Medical History:    Colon polyp    Disorder of thyroid    CYST GROWING ON RT     Fatty liver    Mild per ultrasound report    Glucose intolerance (impaired glucose tolerance)    History of blood transfusion    AS INFANT    Hyperlipidemia    Labral tear of shoulder    L    Pulmonary nodule    3mm and 4 mm in the RML, incidental finding on CT heart score    Visual impairment    GLASSES      Past Surgical History:   Procedure Laterality Date    Appendectomy      Colonoscopy  04/2021    Diya, +TA polyp, +HP polyp, rpt 5 years    Colonoscopy & polypectomy  2/16    polyp; tics; repeat 5 yrs (SSA)    Colonoscopy,remv lesn,snare N/A 2/9/2016    Procedure: COLONOSCOPY, POSSIBLE BIOPSY, POSSIBLE POLYPECTOMY 08762;  Surgeon: Kareem Keane MD;  Location: [facility]    Other surgical history      L shoulder for labral tear    Thyroidectomy Right     Dr. Garcia, 2016      Family History    Problem Relation Age of Onset    Hypertension Father     Other (hyperlipidemia [Other]) Mother       Family Status   Relation Status    Fa  at age 58        prostate cancer    Mo Alive      Social History     Socioeconomic History    Marital status:    Tobacco Use    Smoking status: Never    Smokeless tobacco: Never   Vaping Use    Vaping status: Some Days    Substances: Nicotine    Devices: Disposable   Substance and Sexual Activity    Alcohol use: Yes     Alcohol/week: 3.0 standard drinks of alcohol     Types: 3 Cans of beer per week     Comment: occasional    Drug use: Yes     Types: Cannabis        ROS:  General: lower energy       Physical Exam:  General: No apparent distress when conversing with me  Psych: Alert and oriented x 3, speech was not pressured  Resp: No respiratory distress noted when conversing with me, able to speak in full sentences. Raspy voice    ASSESSMENT AND PLAN    1. Sore throat  For the sore throat patient can do salt water gargles, throat lozenges, Tylenol and/or Motrin for the discomfort   Take prescribed medications as directed.   Rest and push fluids      2. Frontal headache  Perhaps an early sinus infection.  Tx as above.      No follow-ups on file.    Orders for this visit:    No orders of the defined types were placed in this encounter.      None    Meds & Refills for this Visit:  Requested Prescriptions     Signed Prescriptions Disp Refills    azithromycin (ZITHROMAX Z-RONI) 250 MG Oral Tab 6 tablet 0     Sig: Take 2 tablets (500 mg total) by mouth daily for 1 day, THEN 1 tablet (250 mg total) daily for 4 days.             Authorized by Kwaku Delatorre M.D.      Please note that the following visit was completed using two-way, real-time interactive audio and/or video communication.  This has been done in good latisha to provide continuity of care in the best interest of the provider-patient relationship, due to the ongoing public health crisis/national emergency and  because of restrictions of visitation.  There are limitations of this visit as no physical exam could be performed.  Every conscious effort was taken to allow for sufficient and adequate time.  This billing was spent on reviewing labs, medications, radiology tests and decision making.  Appropriate medical decision-making and tests are ordered as detailed in the plan of care above.”        [1]   Allergies  Allergen Reactions    Penicillins HIVES

## 2025-02-04 ENCOUNTER — TELEMEDICINE (OUTPATIENT)
Dept: FAMILY MEDICINE CLINIC | Facility: CLINIC | Age: 55
End: 2025-02-04
Payer: COMMERCIAL

## 2025-02-04 DIAGNOSIS — J31.0 PURULENT RHINITIS: Primary | ICD-10-CM

## 2025-02-04 DIAGNOSIS — J34.89 SINUS PAIN: ICD-10-CM

## 2025-02-04 PROCEDURE — 98006 SYNCH AUDIO-VIDEO EST MOD 30: CPT | Performed by: FAMILY MEDICINE

## 2025-02-04 RX ORDER — AZITHROMYCIN 250 MG/1
TABLET, FILM COATED ORAL
Qty: 6 TABLET | Refills: 1 | Status: SHIPPED | OUTPATIENT
Start: 2025-02-04 | End: 2025-02-09

## 2025-02-04 NOTE — PROGRESS NOTES
My Chart/ Video/Telephone Visit Check-In    Elver Rosenberg and/or caregiver verbally consents a video Check-In service on 02/04/25.  Patient understands and accepts financial responsibility for any deductible, co-insurance and/or co-pays associated with this service.    Duration of the service including reviewing the chart, engaging with the patient and giving medical guidance: 12 minutes    Elver Rosenberg is a 54 year old male.    CC:  sick    HPI:  Flu like symptoms 5-6 days ago with chest congestion, aches and fatigue. Now has developed sinus pain in the frontal and maxillary sinuses. Nasal secretions have gone from clear to thick and yellow-green. Tylenol, Mucinex, and Dayquil of some help.     Allergies as of 02/04/2025 - Review Complete 12/02/2024   Allergen Reaction Noted    Penicillins HIVES 10/22/2012        Current Meds:  Current Outpatient Medications   Medication Sig Dispense Refill    amLODIPine 5 MG Oral Tab Take 1 tablet (5 mg total) by mouth daily.      ATORVASTATIN 40 MG Oral Tab TAKE 1 TABLET BY MOUTH EVERY DAY AT NIGHT 90 tablet 1    Losartan Potassium-HCTZ 100-12.5 MG Oral Tab TAKE 1 TABLET BY MOUTH EVERY DAY 90 tablet 3    aspirin 81 MG Oral Chew Tab Chew 1 tablet (81 mg total) by mouth daily.          History:  Past Medical History:    Colon polyp    Disorder of thyroid    CYST GROWING ON RT     Fatty liver    Mild per ultrasound report    Glucose intolerance (impaired glucose tolerance)    History of blood transfusion    AS INFANT    Hyperlipidemia    Labral tear of shoulder    L    Pulmonary nodule    3mm and 4 mm in the RML, incidental finding on CT heart score    Visual impairment    GLASSES      Past Surgical History:   Procedure Laterality Date    Appendectomy      Colonoscopy  04/2021    Diya, +TA polyp, +HP polyp, rpt 5 years    Colonoscopy & polypectomy  2/16    polyp; tics; repeat 5 yrs ([facility])    Colonoscopy,remv lesn,snare N/A 2/9/2016    Procedure: COLONOSCOPY, POSSIBLE BIOPSY,  POSSIBLE POLYPECTOMY 06401;  Surgeon: Kareem Keane MD;  Location: [facility]    Other surgical history      L shoulder for labral tear    Thyroidectomy Right     Dr. Garcia, 2016      Family History   Problem Relation Age of Onset    Hypertension Father     Other (hyperlipidemia [Other]) Mother       Family Status   Relation Status    Fa  at age 58        prostate cancer    Mo Alive      Social History     Socioeconomic History    Marital status:    Tobacco Use    Smoking status: Never    Smokeless tobacco: Never   Vaping Use    Vaping status: Some Days    Substances: Nicotine    Devices: Disposable   Substance and Sexual Activity    Alcohol use: Yes     Alcohol/week: 3.0 standard drinks of alcohol     Types: 3 Cans of beer per week     Comment: occasional    Drug use: Yes     Types: Cannabis        ROS:  General: lower energy  Cardiovascular/Pulses: Denies exertional chest pain or pressure  Respiratory: Denies dyspnea, dyspnea on exertion, wheezing     Physical Exam:  General: No apparent distress when conversing with me  Psych: Alert and oriented x 3, speech was not pressured  Resp: No respiratory distress noted when conversing with me, able to speak in full sentences. Voice is raspy    ASSESSMENT AND PLAN    1. Purulent rhinitis  Rest and push fluids  Motrin and/or Tylenol as needed for fever control.   Take prescribed medications as directed.     2. Sinus pain  As above       No follow-ups on file.    Orders for this visit:    No orders of the defined types were placed in this encounter.      None    Meds & Refills for this Visit:  Requested Prescriptions     Signed Prescriptions Disp Refills    azithromycin (ZITHROMAX Z-RONI) 250 MG Oral Tab 6 tablet 1     Sig: Take 2 tablets (500 mg total) by mouth daily for 1 day, THEN 1 tablet (250 mg total) daily for 4 days.             Authorized by Kwaku Delatorre M.D.          Please note that the following visit was completed using two-way,  real-time interactive audio and/or video communication.  This has been done in good latisha to provide continuity of care in the best interest of the provider-patient relationship, due to the ongoing public health crisis/national emergency and because of restrictions of visitation.  There are limitations of this visit as no physical exam could be performed.  Every conscious effort was taken to allow for sufficient and adequate time.  This billing was spent on reviewing labs, medications, radiology tests and decision making.  Appropriate medical decision-making and tests are ordered as detailed in the plan of care above.”

## 2025-03-03 RX ORDER — LOSARTAN POTASSIUM AND HYDROCHLOROTHIAZIDE 12.5; 1 MG/1; MG/1
1 TABLET ORAL DAILY
Qty: 90 TABLET | Refills: 2 | Status: SHIPPED | OUTPATIENT
Start: 2025-03-03

## (undated) NOTE — LETTER
11/30/18        [address]  [address] [address]      Dear [place],    [address] records indicate that you have outstanding lab work and or testing that was ordered for you and has not yet been completed:  Lab Frequency Next Occurrence   LIPID P

## (undated) NOTE — MR AVS SNAPSHOT
[address] [facility] [address]  [phone]               Thank you for choosing us for [facility]   We are glad to serve you and happy to provide you with this Summaries. If you've been to the Emergency Department or your doctor's office, you can view your past visit information in [facility] by going to Visits < Visit Summaries. [facility]addio questions? Call [phone] for help.   [employer] is NOT to be used for urge